# Patient Record
Sex: FEMALE | Race: WHITE | Employment: UNEMPLOYED | URBAN - NONMETROPOLITAN AREA
[De-identification: names, ages, dates, MRNs, and addresses within clinical notes are randomized per-mention and may not be internally consistent; named-entity substitution may affect disease eponyms.]

---

## 2017-12-28 ENCOUNTER — HOSPITAL ENCOUNTER (OUTPATIENT)
Dept: ULTRASOUND IMAGING | Age: 22
Discharge: HOME | End: 2017-12-28
Payer: MEDICAID

## 2017-12-28 DIAGNOSIS — T83.32XA INTRAUTERINE DEVICE (IUD) MIGRATION, INITIAL ENCOUNTER: ICD-10-CM

## 2017-12-28 PROCEDURE — 76830 TRANSVAGINAL US NON-OB: CPT

## 2021-03-15 ENCOUNTER — HOSPITAL ENCOUNTER (OUTPATIENT)
Age: 26
Setting detail: SPECIMEN
Discharge: HOME | End: 2021-03-15
Payer: MEDICAID

## 2021-03-17 LAB
CULTURE: NORMAL
Lab: NORMAL
SPECIMEN DESCRIPTION: NORMAL

## 2022-02-04 ENCOUNTER — APPOINTMENT (OUTPATIENT)
Dept: ULTRASOUND IMAGING | Age: 27
End: 2022-02-04
Payer: MEDICAID

## 2022-02-04 ENCOUNTER — HOSPITAL ENCOUNTER (EMERGENCY)
Age: 27
Discharge: HOME | End: 2022-02-04
Payer: MEDICAID

## 2022-02-04 VITALS
HEART RATE: 81 BPM | RESPIRATION RATE: 16 BRPM | TEMPERATURE: 98 F | SYSTOLIC BLOOD PRESSURE: 115 MMHG | OXYGEN SATURATION: 97 % | DIASTOLIC BLOOD PRESSURE: 68 MMHG

## 2022-02-04 DIAGNOSIS — O20.0 THREATENED MISCARRIAGE: Primary | ICD-10-CM

## 2022-02-04 LAB
ABO: NORMAL
ANION GAP SERPL CALCULATED.3IONS-SCNC: 15 MEQ/L (ref 8–16)
ANTIBODY SCREEN: NORMAL
BASOPHILS # BLD: 0.5 %
BASOPHILS ABSOLUTE: 0.1 THOU/MM3 (ref 0–0.1)
BUN BLDV-MCNC: 12 MG/DL (ref 7–22)
CALCIUM SERPL-MCNC: 8.8 MG/DL (ref 8.5–10.5)
CHLAMYDIA TRACHOMATIS BY RT-PCR: NOT DETECTED
CHLORIDE BLD-SCNC: 107 MEQ/L (ref 98–111)
CO2: 18 MEQ/L (ref 23–33)
CREAT SERPL-MCNC: 0.6 MG/DL (ref 0.4–1.2)
CT/NG SOURCE: NORMAL
EOSINOPHIL # BLD: 1.1 %
EOSINOPHILS ABSOLUTE: 0.1 THOU/MM3 (ref 0–0.4)
ERYTHROCYTE [DISTWIDTH] IN BLOOD BY AUTOMATED COUNT: 15.2 % (ref 11.5–14.5)
ERYTHROCYTE [DISTWIDTH] IN BLOOD BY AUTOMATED COUNT: 43.1 FL (ref 35–45)
GFR SERPL CREATININE-BSD FRML MDRD: > 90 ML/MIN/1.73M2
GLUCOSE BLD-MCNC: 114 MG/DL (ref 70–108)
HCG,BETA SUBUNIT,QUAL,SERUM: 5.7 MIU/ML (ref 0–5)
HCT VFR BLD CALC: 41.3 % (ref 37–47)
HEMOGLOBIN: 13.3 GM/DL (ref 12–16)
IMMATURE GRANS (ABS): 0.03 THOU/MM3 (ref 0–0.07)
IMMATURE GRANULOCYTES: 0.2 %
KOH PREP: NORMAL
LYMPHOCYTES # BLD: 11.2 %
LYMPHOCYTES ABSOLUTE: 1.3 THOU/MM3 (ref 1–4.8)
MCH RBC QN AUTO: 25.3 PG (ref 26–33)
MCHC RBC AUTO-ENTMCNC: 32.2 GM/DL (ref 32.2–35.5)
MCV RBC AUTO: 78.7 FL (ref 81–99)
MONOCYTES # BLD: 5.6 %
MONOCYTES ABSOLUTE: 0.7 THOU/MM3 (ref 0.4–1.3)
NEISSERIA GONORRHOEAE BY RT-PCR: NOT DETECTED
NUCLEATED RED BLOOD CELLS: 0 /100 WBC
OSMOLALITY CALCULATION: 280 MOSMOL/KG (ref 275–300)
PLATELET # BLD: 337 THOU/MM3 (ref 130–400)
PMV BLD AUTO: 10.7 FL (ref 9.4–12.4)
POTASSIUM SERPL-SCNC: 3.8 MEQ/L (ref 3.5–5.2)
RBC # BLD: 5.25 MILL/MM3 (ref 4.2–5.4)
RH FACTOR: NORMAL
SEG NEUTROPHILS: 81.4 %
SEGMENTED NEUTROPHILS ABSOLUTE COUNT: 9.8 THOU/MM3 (ref 1.8–7.7)
SODIUM BLD-SCNC: 140 MEQ/L (ref 135–145)
TRICHOMONAS PREP: NORMAL
WBC # BLD: 12 THOU/MM3 (ref 4.8–10.8)

## 2022-02-04 PROCEDURE — 86850 RBC ANTIBODY SCREEN: CPT

## 2022-02-04 PROCEDURE — 84702 CHORIONIC GONADOTROPIN TEST: CPT

## 2022-02-04 PROCEDURE — 85025 COMPLETE CBC W/AUTO DIFF WBC: CPT

## 2022-02-04 PROCEDURE — 76817 TRANSVAGINAL US OBSTETRIC: CPT

## 2022-02-04 PROCEDURE — 87070 CULTURE OTHR SPECIMN AEROBIC: CPT

## 2022-02-04 PROCEDURE — 87220 TISSUE EXAM FOR FUNGI: CPT

## 2022-02-04 PROCEDURE — 86900 BLOOD TYPING SEROLOGIC ABO: CPT

## 2022-02-04 PROCEDURE — 87210 SMEAR WET MOUNT SALINE/INK: CPT

## 2022-02-04 PROCEDURE — 87591 N.GONORRHOEAE DNA AMP PROB: CPT

## 2022-02-04 PROCEDURE — 80048 BASIC METABOLIC PNL TOTAL CA: CPT

## 2022-02-04 PROCEDURE — 36415 COLL VENOUS BLD VENIPUNCTURE: CPT

## 2022-02-04 PROCEDURE — 87205 SMEAR GRAM STAIN: CPT

## 2022-02-04 PROCEDURE — 87491 CHLMYD TRACH DNA AMP PROBE: CPT

## 2022-02-04 PROCEDURE — 86901 BLOOD TYPING SEROLOGIC RH(D): CPT

## 2022-02-04 PROCEDURE — 99283 EMERGENCY DEPT VISIT LOW MDM: CPT

## 2022-02-04 ASSESSMENT — ENCOUNTER SYMPTOMS
SORE THROAT: 0
COLOR CHANGE: 0
EYE ITCHING: 0
SHORTNESS OF BREATH: 0
EYE PAIN: 0
VOMITING: 0
WHEEZING: 0
NAUSEA: 0
COUGH: 0
ABDOMINAL PAIN: 0
DIARRHEA: 0
EYE DISCHARGE: 0
BACK PAIN: 0
RHINORRHEA: 0

## 2022-02-04 NOTE — ED NOTES
Pt to ED via intake with c/o vaginal bleeding. Pt reports they were getting their birth control filled on Monday and found out they were pregnant. Pt reports this is their 4th pregnancy. Pt states bleeding has been on going since Monday and pt has not seen their OBGYN yet. Pt denies soaking any pads but has a continuous bleed and occasional clots seen. Pt VSS. Mike SHEPPARD at bedside. Pelvic exam completed.  This RN as witness       Hayes Valdes RN  02/04/22 0396

## 2022-02-04 NOTE — ED PROVIDER NOTES
[facility] 65 [address] COMPLAINT       Chief Complaint   Patient presents with    Vaginal Bleeding       Nurses Notes reviewed and I agree except as notedin the HPI. HISTORY OF PRESENT ILLNESS    Luh Schmitt is a 23 y.o. female who presents has been vaginal spotting since Monday. She felt she was pregnant on Monday. Her last period was on December 3. She went to get her birth control refilled on Monday [facility] and she was thought she was pregnant. She states that her bleeding is heavy this morning so she came in to get checked. She is G4, P3 Ab0    Location/Symptom: Vaginal bleeding  Timing/Onset: Monday  Context/Setting: home  Quality: none  Duration: off and on  Modifying Factors: none  Severity: none    REVIEW OF SYSTEMS     Review of Systems   Constitutional: Negative for activity change, appetite change, chills and fever. HENT: Negative for congestion, ear pain, rhinorrhea and sore throat. Eyes: Negative for pain, discharge and itching. Respiratory: Negative for cough, shortness of breath and wheezing. Cardiovascular: Negative for chest pain. Gastrointestinal: Negative for abdominal pain, diarrhea, nausea and vomiting. Genitourinary: Positive for vaginal bleeding. Negative for difficulty urinating and dysuria. Musculoskeletal: Negative for arthralgias, back pain and myalgias. Skin: Negative for color change and rash. Neurological: Negative for dizziness, seizures, light-headedness and headaches. Psychiatric/Behavioral: Negative for agitation, confusion, self-injury and suicidal ideas. All other systems reviewed and are negative. PAST MEDICAL HISTORY    has no past medical history on file. SURGICAL HISTORY      has no past surgical history on file. CURRENT MEDICATIONS       There are no discharge medications for this patient. ALLERGIES     has no allergies on file.     HISTORY     has no family status information on file. family history is not on file. SOCIALHISTORY          PHYSICAL EXAM     INITIAL VITALS:  oral temperature is 98 °F (36.7 °C). Her blood pressure is 115/68 and her pulse is 81. Her respiration is 16 and oxygen saturation is 97%. Physical Exam  Vitals and nursing note reviewed. Constitutional:       Comments: Well Developed Well Nourished Appearing     HENT:      Head: Normocephalic and atraumatic. Eyes:      Pupils: Pupils are equal, round, and reactive to light. Cardiovascular:      Rate and Rhythm: Normal rate and regular rhythm. Heart sounds: Normal heart sounds. Pulmonary:      Effort: Pulmonary effort is normal. No respiratory distress. Breath sounds: Normal breath sounds. No wheezing. Abdominal:      General: Bowel sounds are normal. There is no distension. Palpations: Abdomen is soft. Genitourinary:     Comments: Mild amount of blood in vaginal vault cervix could be seen without any need for any Q-tips. Cervix is closed. There is no clots  Musculoskeletal:      Cervical back: Normal range of motion and neck supple. DIFFERENTIAL DIAGNOSIS:   Vaginal bleeding    DIAGNOSTIC RESULTS     EKG: All EKG's are interpreted by the Emergency Department Physician who either signs or Co-signs this chart in the absence of a cardiologist.      RADIOLOGY: non-plain film images(s) such as CT, Ultrasound and MRI are read by the radiologist.  US OB TRANSVAGINAL   Final Result      1. No gestational sac is present within the uterus. 2. The endometrium is thickened. 3. No extraovarian adnexal lesion is seen to reflect ectopic pregnancy at this time. Correlate with serial beta hCG levels. Abdominal ultrasound or CT abdomen and pelvis with contrast can be obtained to evaluate for the rare possibility of an abdominal    ectopic pregnancy.       4. A 4.3 x 1 x 0.9 cm echogenic material is seen within the right adnexa vessel likely relating to a venous thrombus. 5. A 1.1 cm echogenic focus in the lower uterine segment likely relates to blood clot            **This report has been created using voice recognition software. It may contain minor errors which are inherent in voice recognition technology. **      Final report electronically signed by Dr Denisha Marlow on 2/4/2022 11:08 AM            LABS:   Labs Reviewed   CBC WITH AUTO DIFFERENTIAL - Abnormal; Notable for the following components:       Result Value    WBC 12.0 (*)     MCV 78.7 (*)     MCH 25.3 (*)     RDW-CV 15.2 (*)     Segs Absolute 9.8 (*)     All other components within normal limits   BASIC METABOLIC PANEL - Abnormal; Notable for the following components:    CO2 18 (*)     Glucose 114 (*)     All other components within normal limits   HCG, QUANTITATIVE, PREGNANCY - Abnormal; Notable for the following components:    hCG,Beta Subunit,Qual,Serum 5.7 (*)     All other components within normal limits   SUZANNE Nely Collet)    Narrative:     Source: vaginal OB patient       Site:           Current Antibiotics: not stated   WET PREP, GENITAL    Narrative:     Source: vaginal OB patient       Site:           Current Antibiotics: not stated   C. TRACHOMATIS / N. GONORRHOEAE, DNA   CULTURE, GENITAL    Narrative:     Source: vaginal OB patient       Site:           Current Antibiotics: not stated   ANION GAP   GLOMERULAR FILTRATION RATE, ESTIMATED   OSMOLALITY   TYPE AND SCREEN       EMERGENCY DEPARTMENT COURSE:   :    Vitals:    02/04/22 0850 02/04/22 0851 02/04/22 1100   BP:  137/88 115/68   Pulse: 81     Resp: 16  16   Temp: 98 °F (36.7 °C)     TempSrc: Oral     SpO2: 97%  97%     Patient was seen history physical exam was performed. I discussed the case with Dr. Rahul Jarvis we will follow up her quant in 48 hours and check in with the office on Monday. See disposition below    CRITICAL CARE:  None    CONSULTS:  None    PROCEDURES:  None    FINAL IMPRESSION      1.  Threatened miscarriage DISPOSITION/PLAN   Discharge    PATIENT REFERRED TO:  Manny Rodriguez DO  [address] [place] [address]  [phone]    In 3 days  Get Beta HCG done on Sunday 2/6 and call office on monday 2/7 for further instructions      DISCHARGE MEDICATIONS:  There are no discharge medications for this patient.       (Please note that portions of this note were completed with a voice recognitionprogram.  Efforts were made to edit the dictations but occasionally words are mis-transcribed.)    VALARIE Rebolledo Adrian Lisa Ferris  02/04/22 6649

## 2022-02-07 LAB
GENITAL CULTURE, ROUTINE: NORMAL
GRAM STAIN RESULT: NORMAL

## 2022-02-22 ENCOUNTER — HOSPITAL ENCOUNTER (EMERGENCY)
Age: 27
Discharge: HOME | End: 2022-02-22
Payer: MEDICAID

## 2022-02-22 VITALS
SYSTOLIC BLOOD PRESSURE: 129 MMHG | RESPIRATION RATE: 18 BRPM | WEIGHT: 208 LBS | TEMPERATURE: 98.4 F | BODY MASS INDEX: 34.66 KG/M2 | HEIGHT: 65 IN | HEART RATE: 86 BPM | DIASTOLIC BLOOD PRESSURE: 77 MMHG | OXYGEN SATURATION: 98 %

## 2022-02-22 DIAGNOSIS — K02.9 PAIN DUE TO DENTAL CARIES: Primary | ICD-10-CM

## 2022-02-22 PROCEDURE — 99282 EMERGENCY DEPT VISIT SF MDM: CPT

## 2022-02-22 RX ORDER — CLINDAMYCIN HYDROCHLORIDE 300 MG/1
300 CAPSULE ORAL 3 TIMES DAILY
Qty: 21 CAPSULE | Refills: 0 | Status: SHIPPED | OUTPATIENT
Start: 2022-02-22 | End: 2022-03-01

## 2022-02-22 RX ORDER — ACETAMINOPHEN AND CODEINE PHOSPHATE 300; 30 MG/1; MG/1
1 TABLET ORAL EVERY 6 HOURS PRN
Qty: 10 TABLET | Refills: 0 | Status: SHIPPED | OUTPATIENT
Start: 2022-02-22 | End: 2022-02-25

## 2022-02-22 ASSESSMENT — ENCOUNTER SYMPTOMS
VOMITING: 0
FACIAL SWELLING: 0
SHORTNESS OF BREATH: 0
COUGH: 0
ABDOMINAL PAIN: 0
NAUSEA: 0
COLOR CHANGE: 0
SORE THROAT: 0

## 2022-02-22 ASSESSMENT — PAIN - FUNCTIONAL ASSESSMENT: PAIN_FUNCTIONAL_ASSESSMENT: 0-10

## 2022-02-22 ASSESSMENT — PAIN DESCRIPTION - PROGRESSION: CLINICAL_PROGRESSION: GRADUALLY WORSENING

## 2022-02-22 ASSESSMENT — PAIN DESCRIPTION - FREQUENCY: FREQUENCY: CONTINUOUS

## 2022-02-22 ASSESSMENT — PAIN DESCRIPTION - PAIN TYPE: TYPE: ACUTE PAIN

## 2022-02-22 ASSESSMENT — PAIN DESCRIPTION - LOCATION: LOCATION: TEETH

## 2022-02-22 ASSESSMENT — PAIN DESCRIPTION - DESCRIPTORS: DESCRIPTORS: ACHING

## 2022-02-22 ASSESSMENT — PAIN DESCRIPTION - ONSET: ONSET: ON-GOING

## 2022-02-22 ASSESSMENT — PAIN SCALES - GENERAL: PAINLEVEL_OUTOF10: 10

## 2022-02-22 NOTE — ED NOTES
Pt presents to the ER for a dental pain and a broken tooth. Pt states that she has not been able to get in touch with a dentist and it in a lot of pain.      Diane Ortiz  02/22/22 1021

## 2022-02-22 NOTE — ED PROVIDER NOTES
[facility] [address] [address] EMERGENCY DEPT      CHIEF COMPLAINT       Chief Complaint   Patient presents with    Dental Pain       Nurses Notes reviewed and I agree except as noted in the HPI. HISTORY OF PRESENT ILLNESS    Kalyan Mai is a 23 y.o. female who presents for dental pain that began Thursday. Patient states that she was eating dinner and the tooth shattered in her mouth. She rates her pain a 10/10 stating that it's aching and sharp. She has been taking maximal doses of Tylenol and Motrin, her last dose was at 4AM, this does not seem to help her pain. She reports calling her dentist over 20 times since last Thursday and they have not answered. She states that she has had multiple teeth pulled in 2020 and her dentist told her that she will eventually have to get this tooth removed also. She states that she might have a piece of tooth left in there. Patient denies fever, chills, nausea, vomiting, trouble swallowing, drooling. Patient denies possibility of pregnancy. Patient does not smoke, drink alcohol, or use illicit drugs. REVIEW OF SYSTEMS     Review of Systems   Constitutional: Negative for chills and fever. HENT: Positive for dental problem. Negative for drooling, ear pain, facial swelling and sore throat. Respiratory: Negative for cough and shortness of breath. Cardiovascular: Negative for chest pain. Gastrointestinal: Negative for abdominal pain, nausea and vomiting. Musculoskeletal: Negative for neck pain. Skin: Negative for color change and rash. Neurological: Positive for headaches. Negative for dizziness and speech difficulty. Hematological: Negative for adenopathy. PAST MEDICAL HISTORY    has no past medical history on file. SURGICAL HISTORY      has no past surgical history on file. CURRENT MEDICATIONS       Discharge Medication List as of 2/22/2022 11:15 AM          ALLERGIES     is allergic to penicillins.     FAMILY HISTORY     has no family status information on file. family history is not on file. SOCIAL HISTORY        PHYSICAL EXAM     INITIAL VITALS:  height is 5' 5\" (1.651 m) and weight is 208 lb (94.3 kg). Her oral temperature is 98.4 °F (36.9 °C). Her blood pressure is 129/77 and her pulse is 86. Her respiration is 18 and oxygen saturation is 98%. Physical Exam  Vitals and nursing note reviewed. Constitutional:       General: She is not in acute distress. Appearance: She is well-developed. She is not toxic-appearing or diaphoretic. HENT:      Head: Normocephalic and atraumatic. Jaw: No trismus. Right Ear: Tympanic membrane, ear canal and external ear normal.      Left Ear: Tympanic membrane, ear canal and external ear normal.      Nose: Nose normal. No rhinorrhea. Mouth/Throat:      Lips: Pink. Mouth: Mucous membranes are moist.      Dentition: Dental tenderness present. Pharynx: Oropharynx is clear. Uvula midline. No posterior oropharyngeal erythema or uvula swelling. Comments: There is multiple teeth missing on exam  Eyes:      General: Lids are normal.      Conjunctiva/sclera: Conjunctivae normal.      Pupils: Pupils are equal, round, and reactive to light. Neck:      Thyroid: No thyroid mass. Trachea: Trachea normal. No tracheal deviation. Cardiovascular:      Rate and Rhythm: Normal rate and regular rhythm. Heart sounds: Normal heart sounds. Pulmonary:      Effort: Pulmonary effort is normal. No respiratory distress. Breath sounds: Normal breath sounds. No decreased breath sounds or wheezing. Abdominal:      General: There is no distension. Palpations: Abdomen is soft. Abdomen is not rigid. Tenderness: There is no abdominal tenderness. Musculoskeletal:         General: Normal range of motion. Cervical back: Normal range of motion and neck supple. No edema or rigidity.    Lymphadenopathy:      Head:      Right side of head: No submental, submandibular or preauricular adenopathy. Left side of head: No submental, submandibular or preauricular adenopathy. Cervical: No cervical adenopathy. Skin:     General: Skin is warm and dry. Coloration: Skin is not pale. Findings: No rash. Neurological:      Mental Status: She is alert and oriented to person, place, and time. GCS: GCS eye subscore is 4. GCS verbal subscore is 5. GCS motor subscore is 6. Gait: Gait normal.   Psychiatric:         Mood and Affect: Mood normal.         Speech: Speech normal.         Behavior: Behavior normal. Behavior is cooperative. Thought Content: Thought content normal.         DIFFERENTIAL DIAGNOSIS:   Including but not limited to: Dental pain, fractured tooth, considered but less likely as clinically not evident an infection, abscess    DIAGNOSTIC RESULTS     EKG: All EKG's are interpreted by theState mental health facility Department Physician who either signs or Co-signs this chart in the absence of a cardiologist.  None    RADIOLOGY: non-plain film images(s) such as CT,Ultrasound and MRI are read by the radiologist.  Plain radiographic images are visualized and preliminarily interpreted by the emergency physician unless otherwise stated below. No orders to display       LABS:   Labs Reviewed - No data to display    EMERGENCY DEPARTMENT COURSE:   Vitals:    Vitals:    02/22/22 1022   BP: 129/77   Pulse: 86   Resp: 18   Temp: 98.4 °F (36.9 °C)   TempSrc: Oral   SpO2: 98%   Weight: 208 lb (94.3 kg)   Height: 5' 5\" (1.651 m)       Patient was seen in the emergency department during the global pandemic, when there was surge capacity and regional healthcare crisis. MDM:  The patient was seen and evaluated within the ED today for dental pain. On exam, I appreciated a multiple missing teeth on the right side. Old records were reviewed. Within the department, I observed the patient's vital signs to be within acceptable range.  Patient was treated with Clindamycin 300 mg and Tylenol-Codeine . I observed the patient's condition to modestly improve during the duration of their stay. I have considered infection and this patient's presentation is not consistent with such entities and therefore, no further testing was warranted. The patient was comfortable with the plan of discharge home and to follow up with her dentist. Anticipatory guidance was given. The patient was instructed to return to the emergency department for any worsening of their symptoms. Patient was discharged from the emergency department in good condition with all questions answered. See disposition below. I have given the patient strict written and verbal instructions about care at home, follow-up, and signs and symptoms of worsening of condition and they did verbalize understanding. CRITICAL CARE:   None    CONSULTS:  None    PROCEDURES:  None    FINAL IMPRESSION      1. Pain due to dental caries          DISPOSITION/PLAN     1. Pain due to dental caries        PATIENT REFERRED TO:  [address]  [address] [facility] [address]  [phone]  Schedule an appointment as soon as possible for a visit         DISCHARGE MEDICATIONS:  Discharge Medication List as of 2/22/2022 11:15 AM      START taking these medications    Details   clindamycin (CLEOCIN) 300 MG capsule Take 1 capsule by mouth 3 times daily for 7 days, Disp-21 capsule, R-0Print      acetaminophen-codeine (TYLENOL/CODEINE #3) 300-30 MG per tablet Take 1 tablet by mouth every 6 hours as needed for Pain (not relieved by ibuprofen) for up to 3 days. Intended supply: 3 days.  Take lowest dose possible to manage pain, Disp-10 tablet, R-0Print             (Please note that portions of this note were completed with a voice recognition program.  Efforts were made to edit the dictations but occasionally words are mis-transcribed.)    Pat Gray PA-C 02/25/22 11:37 AM    DAVEY Boyle PA-C  02/25/22 [address] [place] [address]

## 2022-03-26 ENCOUNTER — HOSPITAL ENCOUNTER (EMERGENCY)
Age: 27
Discharge: HOME | End: 2022-03-26
Attending: EMERGENCY MEDICINE
Payer: MEDICAID

## 2022-03-26 DIAGNOSIS — K08.89 DENTALGIA: Primary | ICD-10-CM

## 2022-03-26 PROCEDURE — 99282 EMERGENCY DEPT VISIT SF MDM: CPT

## 2022-03-26 RX ORDER — HYDROCODONE BITARTRATE AND ACETAMINOPHEN 5; 325 MG/1; MG/1
1 TABLET ORAL 2 TIMES DAILY PRN
Qty: 10 TABLET | Refills: 0 | Status: SHIPPED | OUTPATIENT
Start: 2022-03-26 | End: 2022-03-31

## 2022-03-26 RX ORDER — CLINDAMYCIN HYDROCHLORIDE 300 MG/1
300 CAPSULE ORAL 3 TIMES DAILY
Qty: 21 CAPSULE | Refills: 0 | Status: SHIPPED | OUTPATIENT
Start: 2022-03-26 | End: 2022-04-02

## 2022-03-26 ASSESSMENT — PAIN DESCRIPTION - ORIENTATION: ORIENTATION: RIGHT

## 2022-03-26 ASSESSMENT — PAIN DESCRIPTION - DESCRIPTORS: DESCRIPTORS: SHARP;ACHING

## 2022-03-26 ASSESSMENT — PAIN DESCRIPTION - PAIN TYPE: TYPE: ACUTE PAIN

## 2022-03-26 ASSESSMENT — PAIN DESCRIPTION - LOCATION: LOCATION: TEETH

## 2022-03-26 ASSESSMENT — PAIN SCALES - GENERAL: PAINLEVEL_OUTOF10: 10

## 2022-03-26 ASSESSMENT — PAIN DESCRIPTION - FREQUENCY: FREQUENCY: CONTINUOUS

## 2022-03-27 NOTE — ED PROVIDER NOTES
[facility] EMERGENCY DEPT      CHIEF COMPLAINT       Chief Complaint   Patient presents with    Dental Pain       Nurses Notes reviewed and I agree except as noted in the HPI. HISTORY OF PRESENT ILLNESS    Christina Boykin is a 23 y.o. female who presents with complaint of dental pain, patient states that she broke a tooth approximately 1 week ago, states that the pain got worse in the past 2 days. She has an appointment with the dentist at the end of the month. No fevers chills, no difficulty swallowing. REVIEW OF SYSTEMS      Review of Systems   Constitutional: Negative for fever, chills, diaphoresis and fatigue. HENT: Negative for congestion, drooling, facial swelling and sore throat. Positive for dental pain. Eyes: Negative for photophobia, pain and discharge. Respiratory: Negative for cough, shortness of breath, wheezing and stridor. Cardiovascular: Negative for chest pain, palpitations and leg swelling. Gastrointestinal: Negative for abdominal pain, blood in stool and abdominal distention. Genitourinary: Negative for dysuria, urgency, hematuria and difficulty urinating. Musculoskeletal: Negative for gait problem, neck pain and neck stiffness. Skin; No rash, No itching  Neurological: Negative for seizures, weakness and numbness. PAST MEDICAL HISTORY    has no past medical history on file. SURGICAL HISTORY      has no past surgical history on file. CURRENT MEDICATIONS       Discharge Medication List as of 3/26/2022  9:56 PM          ALLERGIES     is allergic to penicillins. FAMILY HISTORY     has no family status information on file. family history is not on file. SOCIAL HISTORY      reports that she has been smoking cigarettes. She has never used smokeless tobacco. She reports that she does not drink alcohol and does not use drugs. PHYSICAL EXAM     INITIAL VITALS:  height is 5' 4\" (1.626 m) and weight is 194 lb (88 kg).  Her oral temperature is 98.4 °F (36.9 °C). Her blood pressure is 128/61 and her pulse is 70. Her respiration is 16 and oxygen saturation is 100%. Physical Exam   Constitutional:  well-developed and well-nourished. HENT: Head: Normocephalic, atraumatic, Bilateral external ears normal, Oropharynx mosit, No oral exudates, Nose normal.   Positive for dental caries. No active bleeding, no gingivitis. Eyes: PERRL, EOMI, Conjunctiva normal, No discharge. No scleral icterus  Neck: Normal range of motion, No tenderness, Supple  Cardiovascular: Normal rate, regular rhythm, S1 normal and S2 normal.  Exam reveals no gallop. Pulmonary/Chest: Effort normal and breath sounds normal. No accessory muscle usage or stridor. No respiratory distress. no wheezes. has no rales. exhibits no tenderness. Abdominal: Soft. Bowel sounds are normal.  exhibits no distension. There is no tenderness. There is no rebound and no guarding. Extremities: No edema, no tenderness, no cyanosis, no clubbing. Musculoskeletal: Good range of motion in major joints is observed. No major deformities noted. Neurological: Alert and oriented ×3, normal motor function, normal sensory function, no focal deficits. GCS 15. Skin: Skin is warm, dry and intact. No rash noted. No erythema. Psychiatric: Affect normal, judgment normal, mood normal.  DIFFERENTIAL DIAGNOSIS:       DIAGNOSTIC RESULTS     EKG: All EKG's are interpreted by the Emergency Department Physician who either signs or Co-signs this chart in the absence of a cardiologist.      RADIOLOGY: non-plain film images(s) such as CT, Ultrasound and MRI are read by the radiologist.  Plain radiographic images are visualized and preliminarily interpreted by the emergency physician unless otherwise stated below.     LABS:   Labs Reviewed - No data to display    EMERGENCY DEPARTMENT COURSE:   Vitals:    Vitals:    03/26/22 2119   BP: 128/61   Pulse: 70   Resp: 16   Temp: 98.4 °F (36.9 °C)   TempSrc: Oral   SpO2: 100%   Weight: 194 lb (88 kg)   Height: 5' 4\" (1.626 m)     Patient presenting with complaint of dental pain, dental caries noted. Otherwise no edema involving the oral mucosa, no signs of Ludewig's angina on examination, no facial edema. Patient started antibiotics with pain control. CRITICAL CARE:       CONSULTS:  None    PROCEDURES:  None    FINAL IMPRESSION      1. Dentalgia          DISPOSITION/PLAN   Decision To Discharge    PATIENT REFERRED TO:  No follow-up provider specified. DISCHARGE MEDICATIONS:  Discharge Medication List as of 3/26/2022  9:56 PM      START taking these medications    Details   HYDROcodone-acetaminophen (NORCO) 5-325 MG per tablet Take 1 tablet by mouth 2 times daily as needed for Pain for up to 5 days. , Disp-10 tablet, R-0Print      clindamycin (CLEOCIN) 300 MG capsule Take 1 capsule by mouth 3 times daily for 7 days, Disp-21 capsule, R-0Print             (Please note that portions of this note were completed with a voice recognition program.  Efforts were made to edit the dictations but occasionally words are mis-transcribed.)    Colette Gallardo, [address] [place] [address],   03/26/22 4360

## 2022-04-04 VITALS
HEIGHT: 64 IN | SYSTOLIC BLOOD PRESSURE: 128 MMHG | OXYGEN SATURATION: 100 % | BODY MASS INDEX: 33.12 KG/M2 | WEIGHT: 194 LBS | TEMPERATURE: 98.4 F | HEART RATE: 70 BPM | RESPIRATION RATE: 16 BRPM | DIASTOLIC BLOOD PRESSURE: 61 MMHG

## 2022-06-15 ENCOUNTER — HOSPITAL ENCOUNTER (EMERGENCY)
Age: 27
Discharge: HOME | End: 2022-06-15
Payer: MEDICAID

## 2022-06-15 VITALS
HEART RATE: 62 BPM | DIASTOLIC BLOOD PRESSURE: 81 MMHG | HEIGHT: 65 IN | RESPIRATION RATE: 16 BRPM | SYSTOLIC BLOOD PRESSURE: 129 MMHG | WEIGHT: 200 LBS | OXYGEN SATURATION: 98 % | BODY MASS INDEX: 33.32 KG/M2 | TEMPERATURE: 96.8 F

## 2022-06-15 DIAGNOSIS — K08.89 ODONTALGIA: Primary | ICD-10-CM

## 2022-06-15 PROCEDURE — 99283 EMERGENCY DEPT VISIT LOW MDM: CPT

## 2022-06-15 RX ORDER — LIDOCAINE HYDROCHLORIDE 20 MG/ML
5 SOLUTION OROPHARYNGEAL PRN
Qty: 100 ML | Refills: 0 | Status: SHIPPED | OUTPATIENT
Start: 2022-06-15

## 2022-06-15 RX ORDER — CLINDAMYCIN HYDROCHLORIDE 150 MG/1
450 CAPSULE ORAL 3 TIMES DAILY
Qty: 90 CAPSULE | Refills: 0 | Status: SHIPPED | OUTPATIENT
Start: 2022-06-15 | End: 2022-06-25

## 2022-06-15 ASSESSMENT — PAIN DESCRIPTION - PAIN TYPE: TYPE: ACUTE PAIN

## 2022-06-15 ASSESSMENT — PAIN SCALES - GENERAL: PAINLEVEL_OUTOF10: 10

## 2022-06-15 ASSESSMENT — PAIN DESCRIPTION - ONSET: ONSET: ON-GOING

## 2022-06-15 ASSESSMENT — PAIN DESCRIPTION - LOCATION: LOCATION: TEETH

## 2022-06-15 ASSESSMENT — PAIN DESCRIPTION - DESCRIPTORS: DESCRIPTORS: ACHING;THROBBING

## 2022-06-15 ASSESSMENT — PAIN DESCRIPTION - FREQUENCY: FREQUENCY: CONTINUOUS

## 2022-06-15 ASSESSMENT — PAIN DESCRIPTION - ORIENTATION: ORIENTATION: RIGHT

## 2022-06-15 ASSESSMENT — PAIN - FUNCTIONAL ASSESSMENT: PAIN_FUNCTIONAL_ASSESSMENT: 0-10

## 2022-06-15 NOTE — ED PROVIDER NOTES
[facility]  eMERGENCY dEPARTMENT eNCOUnter          CHIEF COMPLAINT       Chief Complaint   Patient presents with    Dental Pain       Nurses Notes reviewed and I agree except as noted in the HPI. HISTORY OF PRESENT ILLNESS    Desmond Cuevas is a 23 y.o. female who presents to the Emergency Department for the evaluation of right-sided dental pain. Patient states that she has been having pain from a right upper tooth with no known injury. Pain has been worse over the past 3 days, worsens with oral intake, even water. She reports subjective swelling at the area as well as nausea which she attributes to poor ability to tolerate oral intake because of the pain. She has tried multiple over-the-counter medications including aspirin, Motrin, hot and cold compresses without relief of the pain. She has an appointment for dental extraction in July but could not tolerate the pain so she came in for evaluation. Denies any difficulty swallowing, difficulty breathing or trismus. Denies possibility of pregnancy. The HPI was provided by the patient. REVIEW OF SYSTEMS     Review of Systems   Constitutional: Negative for fever. HENT: Positive for dental problem. All other systems reviewed and are negative. PAST MEDICAL HISTORY    has no past medical history on file. SURGICAL HISTORY      has no past surgical history on file. CURRENT MEDICATIONS       Discharge Medication List as of 6/15/2022 10:36 AM          ALLERGIES     is allergic to penicillins. FAMILY HISTORY     has no family status information on file. family history is not on file. SOCIAL HISTORY      reports that she has been smoking cigarettes. She has never used smokeless tobacco. She reports that she does not drink alcohol and does not use drugs. PHYSICAL EXAM     INITIAL VITALS:  height is 5' 5\" (1.651 m) and weight is 200 lb (90.7 kg). Her oral temperature is 96.8 °F (36 °C).  Her blood pressure is 129/81 and her pulse is 62. Her respiration is 16 and oxygen saturation is 98%. Physical Exam  Vitals and nursing note reviewed. HENT:      Head: Normocephalic. Mouth/Throat:      Comments: Right maxillary canine decayed to gumline with localized tenderness, subtle erythema and swelling and no visualized abscess. No trismus, phonation changes. Eyes:      Conjunctiva/sclera: Conjunctivae normal.   Cardiovascular:      Rate and Rhythm: Normal rate. Pulmonary:      Effort: Pulmonary effort is normal. No respiratory distress. Musculoskeletal:      Cervical back: Normal range of motion. Skin:     General: Skin is warm and dry. Neurological:      General: No focal deficit present. Mental Status: She is alert and oriented to person, place, and time. Psychiatric:         Mood and Affect: Mood normal.         DIFFERENTIAL DIAGNOSIS:   Differential diagnoses are discussed    DIAGNOSTIC RESULTS     EKG: All EKG's are interpreted by the Emergency Department Physician who either signs or Co-signsthis chart in the absence of a cardiologist.          RADIOLOGY: non-plain film images(s) such as CT, Ultrasound and MRI are read by the radiologist.    No orders to display       LABS:    Labs Reviewed - No data to display    EMERGENCY DEPARTMENT COURSE:   Vitals:    Vitals:    06/15/22 1010   BP: 129/81   Pulse: 62   Resp: 16   Temp: 96.8 °F (36 °C)   TempSrc: Oral   SpO2: 98%   Weight: 200 lb (90.7 kg)   Height: 5' 5\" (1.651 m)      10:48 AM EDT: The patient was seen and evaluated. Patient presents with reassuring vital signs complaining of atraumatic dental pain. No evidence of abscess on exam.  She has localized tenderness over the affected decayed dentition. She has outpatient dental follow-up already arranged. We will give course of clindamycin due to penicillin allergy and supplement with viscous lidocaine for pain control.   Return precautions discussed with patient who is agreeable with the above plan, denying further needs upon discharge. CRITICAL CARE:   None    CONSULTS:  None    PROCEDURES:  None    FINAL IMPRESSION      1.  Odontalgia          DISPOSITION/PLAN   Discharge    PATIENT REFERRED TO:  Your dentist      as scheduled    [facility] EMERGENCY DEPT  [address] [facility] [address]  [phone]    If symptoms worsen      DISCHARGEMEDICATIONS:  Discharge Medication List as of 6/15/2022 10:36 AM      START taking these medications    Details   clindamycin (CLEOCIN) 150 MG capsule Take 3 capsules by mouth 3 times daily for 10 days, Disp-90 capsule, R-0Normal      lidocaine viscous hcl (XYLOCAINE) 2 % SOLN solution Take 5 mLs by mouth as needed for Dental Pain, Disp-100 mL, R-0Normal             (Please note that portions of this note were completedwith a voice recognition program.  Efforts were made to edit the dictations but occasionally words are mis-transcribed.)        Go Galicia PA-C  06/15/22 3373

## 2022-06-16 ENCOUNTER — HOSPITAL ENCOUNTER (EMERGENCY)
Age: 27
Discharge: HOME | End: 2022-06-17
Attending: EMERGENCY MEDICINE
Payer: MEDICAID

## 2022-06-16 VITALS
HEART RATE: 60 BPM | SYSTOLIC BLOOD PRESSURE: 138 MMHG | DIASTOLIC BLOOD PRESSURE: 91 MMHG | RESPIRATION RATE: 18 BRPM | OXYGEN SATURATION: 98 % | TEMPERATURE: 99 F

## 2022-06-16 DIAGNOSIS — K08.89 PAIN, DENTAL: Primary | ICD-10-CM

## 2022-06-16 PROCEDURE — 6370000000 HC RX 637 (ALT 250 FOR IP): Performed by: NURSE PRACTITIONER

## 2022-06-16 PROCEDURE — 99283 EMERGENCY DEPT VISIT LOW MDM: CPT

## 2022-06-16 RX ORDER — HYDROCODONE BITARTRATE AND ACETAMINOPHEN 5; 325 MG/1; MG/1
1 TABLET ORAL ONCE
Status: COMPLETED | OUTPATIENT
Start: 2022-06-16 | End: 2022-06-16

## 2022-06-16 RX ADMIN — HYDROCODONE BITARTRATE AND ACETAMINOPHEN 1 TABLET: 5; 325 TABLET ORAL at 23:20

## 2022-06-16 ASSESSMENT — PAIN SCALES - GENERAL
PAINLEVEL_OUTOF10: 10

## 2022-06-16 ASSESSMENT — PAIN DESCRIPTION - LOCATION
LOCATION: MOUTH
LOCATION: JAW

## 2022-06-16 ASSESSMENT — PAIN - FUNCTIONAL ASSESSMENT: PAIN_FUNCTIONAL_ASSESSMENT: 0-10

## 2022-06-16 ASSESSMENT — PAIN DESCRIPTION - ORIENTATION: ORIENTATION: RIGHT

## 2022-06-17 ASSESSMENT — ENCOUNTER SYMPTOMS
WHEEZING: 0
CONSTIPATION: 0
VOMITING: 0
SHORTNESS OF BREATH: 0
COUGH: 0
NAUSEA: 0
EYE PAIN: 0
EYE DISCHARGE: 0
DIARRHEA: 0
RHINORRHEA: 0
SORE THROAT: 0
COLOR CHANGE: 0
ABDOMINAL DISTENTION: 0

## 2022-06-17 NOTE — ED TRIAGE NOTES
Patient to ED from home with complaints of right side dental pain. Patient states that she was seen yesterday and prescribed antibiotics and lidocaine. Patient states that as soon as she started using the lidocaine gel she began to have swelling. Patient rates pain 10/10 at this time. >4mets

## 2022-06-17 NOTE — ED PROVIDER NOTES
[address] [address]          Pt Name: Antonio Kang  MRN: [id number]  [place] 1995  Date of evaluation: 6/16/2022  Treating Resident Physician: Ludy Gutiérrez MD  Supervising Physician: Jarrod Jones DO    History obtained from the patient. CHIEF COMPLAINT       Chief Complaint   Patient presents with    Dental Pain           HISTORY OF PRESENT ILLNESS    HPI  Antonio Kang is a 23 y.o. female who presents to the emergency department for evaluation of dental pain. Complains of dental pain for the last 2 weeks intermittently. Patient states that she was seen yesterday and given clindamycin has taken a few doses of this time. Patient has complaint of broken tooth and gum swelling to right upper mouth. Symptoms are aggravated by eating and alleviated by nothing. Patient denies any fever or difficulty swallowing. The patient has no other acute complaints at this time. REVIEW OF SYSTEMS   Review of Systems   Constitutional: Negative for fatigue and fever. HENT: Positive for dental problem. Negative for ear pain, rhinorrhea and sore throat. Eyes: Negative for pain and discharge. Respiratory: Negative for cough, shortness of breath and wheezing. Cardiovascular: Negative for chest pain, palpitations and leg swelling. Gastrointestinal: Negative for abdominal distention, constipation, diarrhea, nausea and vomiting. Endocrine: Negative for polydipsia and polyuria. Genitourinary: Negative for difficulty urinating and dysuria. Musculoskeletal: Negative for arthralgias. Skin: Negative for color change, pallor and rash. Neurological: Negative for dizziness, seizures, syncope, weakness and numbness. Psychiatric/Behavioral: Negative for agitation and confusion. PAST MEDICAL AND SURGICAL HISTORY   No past medical history on file. No past surgical history on file.       MEDICATIONS   No current facility-administered medications for this encounter. Current Outpatient Medications:     clindamycin (CLEOCIN) 150 MG capsule, Take 3 capsules by mouth 3 times daily for 10 days, Disp: 90 capsule, Rfl: 0    lidocaine viscous hcl (XYLOCAINE) 2 % SOLN solution, Take 5 mLs by mouth as needed for Dental Pain, Disp: 100 mL, Rfl: 0      SOCIAL HISTORY     Social History     Social History Narrative    Not on file     Social History     Tobacco Use    Smoking status: Current Every Day Smoker     Types: Cigarettes    Smokeless tobacco: Never Used   Substance Use Topics    Alcohol use: Never    Drug use: Never         ALLERGIES     Allergies   Allergen Reactions    Penicillins      Pt does not know what happens when she takes the med. The last time she had it she was 3. FAMILY HISTORY   No family history on file. PREVIOUS RECORDS   Previous records reviewed    PHYSICAL EXAM     ED Triage Vitals [06/16/22 2205]   BP Temp Temp Source Heart Rate Resp SpO2 Height Weight   (!) 160/85 99 °F (37.2 °C) Oral 69 20 98 % -- --     Initial vital signs and nursing assessment reviewed and normal. There is no height or weight on file to calculate BMI. Pulsoximetry is normal per my interpretation. Additional Vital Signs:  Vitals:    06/16/22 2322   BP: (!) 138/91   Pulse: 60   Resp: 18   Temp:    SpO2: 98%       Physical Exam  Constitutional:       Appearance: Normal appearance. HENT:      Head: Normocephalic. Right Ear: External ear normal.      Left Ear: External ear normal.      Nose: Nose normal.      Mouth/Throat:      Mouth: Mucous membranes are moist.      Pharynx: Oropharynx is clear. Comments: Multiple dental caries and fractures noted. Right upper gum tenderness and some mild swelling. Eyes:      Extraocular Movements: Extraocular movements intact. Conjunctiva/sclera: Conjunctivae normal.      Pupils: Pupils are equal, round, and reactive to light.    Cardiovascular:      Rate and Rhythm: Normal rate and regular rhythm. Pulses: Normal pulses. Heart sounds: Normal heart sounds. Pulmonary:      Effort: Pulmonary effort is normal.      Breath sounds: Normal breath sounds. Abdominal:      General: Bowel sounds are normal.      Palpations: Abdomen is soft. Musculoskeletal:         General: Normal range of motion. Cervical back: Normal range of motion and neck supple. Skin:     General: Skin is warm and dry. Capillary Refill: Capillary refill takes less than 2 seconds. Neurological:      General: No focal deficit present. Mental Status: She is alert and oriented to person, place, and time. Psychiatric:         Mood and Affect: Mood normal.         Behavior: Behavior normal.             MEDICAL DECISION MAKING   Initial Assessment:   Patient is a 79-year-old female with complaint of dental pain. Patient was seen here yesterday and started on clindamycin. She stated she returned today because she has been unable to get into dentist and continued to have pain. Patient on exam noted with multiple dental caries and fractures. Patient does also have some noted erythema and edema to right upper gumline. Patient differential diagnosis includes but is not limited to dental caries, dental fracture, dental pain, abscess, Ludwigs angina. Patient noted with intact airway and no signs of Scarlett's angina at this time. Patient does appear to have multiple dental caries and fractures and will need to see a dentist.      Plan:   Patient instructed to continue clindamycin.   Patient will be discharged home at this time with instructions to follow-up with dentist.        ED RESULTS   Laboratory results:  Labs Reviewed - No data to display    Radiologic studies results:  No orders to display       ED Medications administered this visit:   Medications   HYDROcodone-acetaminophen (NORCO) 5-325 MG per tablet 1 tablet (1 tablet Oral Given 6/16/22 3770)         ED COURSE Strict return precautions and follow up instructions were discussed with the patient prior to discharge, with which the patient agrees. MEDICATION CHANGES     Discharge Medication List as of 6/16/2022 11:27 PM            FINAL DISPOSITION     Final diagnoses:   Pain, dental     Condition: condition: good  Dispo: Discharge to home      This transcription was electronically signed. Parts of this transcriptions may have been dictated by use of voice recognition software and electronically transcribed, and parts may have been transcribed with the assistance of an ED scribe. The transcription may contain errors not detected in proofreading. Please refer to my supervising physician's documentation if my documentation differs.     Electronically Signed: Burak Augustin MD, 06/17/22, 2:03 AM       Burak Augustin MD  Resident  06/17/22 6507

## 2022-06-17 NOTE — ED PROVIDER NOTES
[address]    Pt Name: Tito Ho  MRN: [id number]  [place] 1995  Date of evaluation: 9/12/20      I personally saw and examined the patient. I have reviewed and agree with the Resident findings, including all diagnostic interpretations and treatment plans as written. I was present for the key portion of any procedures performed and the inclusive time noted in any critical care statement. History: This patient was seen with Chela Tomas, resident physician. 79-year-old female who is here for dental pain has considerable dental decay and is having trouble getting into see a dentist.  We are providing her with referral information. We are putting her on clindamycin. No obvious airway issues.                 Edy Sams,   06/17/22 6803

## 2023-02-27 ENCOUNTER — HOSPITAL ENCOUNTER (OUTPATIENT)
Age: 28
Setting detail: SPECIMEN
Discharge: HOME | End: 2023-02-27

## 2023-02-27 LAB
CANDIDA SPECIES, DNA PROBE: NEGATIVE
GARDNERELLA VAGINALIS, DNA PROBE: POSITIVE
SOURCE: ABNORMAL
TRICHOMONAS VAGINALIS DNA: NEGATIVE

## 2023-02-28 LAB
CHLAMYDIA DNA UR QL NAA+PROBE: NEGATIVE
N GONORRHOEA DNA UR QL NAA+PROBE: NEGATIVE
SOURCE: NORMAL
SPECIMEN DESCRIPTION: NORMAL
TRICHOMONAS VAGINALI, MOLECULAR: NEGATIVE

## 2023-03-14 ENCOUNTER — HOSPITAL ENCOUNTER (INPATIENT)
Age: 28
LOS: 4 days | Discharge: HOME | End: 2023-03-18
Attending: PSYCHIATRY & NEUROLOGY | Admitting: PSYCHIATRY & NEUROLOGY
Payer: MEDICAID

## 2023-03-14 DIAGNOSIS — R45.851 DEPRESSION WITH SUICIDAL IDEATION: Primary | ICD-10-CM

## 2023-03-14 DIAGNOSIS — F32.A DEPRESSION WITH SUICIDAL IDEATION: Primary | ICD-10-CM

## 2023-03-14 PROBLEM — F33.0 MDD (MAJOR DEPRESSIVE DISORDER), RECURRENT EPISODE, MILD (HCC): Status: ACTIVE | Noted: 2023-03-14

## 2023-03-14 LAB
ALBUMIN SERPL BCG-MCNC: 4.2 G/DL (ref 3.5–5.1)
ALP SERPL-CCNC: 96 U/L (ref 38–126)
ALT SERPL W/O P-5'-P-CCNC: 25 U/L (ref 11–66)
AMPHETAMINES UR QL SCN: NEGATIVE
ANION GAP SERPL CALC-SCNC: 8 MEQ/L (ref 8–16)
APAP SERPL-MCNC: < 5 UG/ML (ref 0–20)
AST SERPL-CCNC: 16 U/L (ref 5–40)
BACTERIA URNS QL MICRO: ABNORMAL /HPF
BARBITURATES UR QL SCN: NEGATIVE
BASOPHILS ABSOLUTE: 0.1 THOU/MM3 (ref 0–0.1)
BASOPHILS NFR BLD AUTO: 0.7 %
BENZODIAZ UR QL SCN: NEGATIVE
BILIRUB SERPL-MCNC: 0.2 MG/DL (ref 0.3–1.2)
BILIRUB UR QL STRIP.AUTO: NEGATIVE
BUN SERPL-MCNC: 11 MG/DL (ref 7–22)
BZE UR QL SCN: NEGATIVE
CALCIUM SERPL-MCNC: 9.2 MG/DL (ref 8.5–10.5)
CANNABINOIDS UR QL SCN: NEGATIVE
CASTS #/AREA URNS LPF: ABNORMAL /LPF
CASTS 2: ABNORMAL /LPF
CHARACTER UR: CLEAR
CHLORIDE SERPL-SCNC: 105 MEQ/L (ref 98–111)
CO2 SERPL-SCNC: 25 MEQ/L (ref 23–33)
COLOR: YELLOW
CREAT SERPL-MCNC: 0.5 MG/DL (ref 0.4–1.2)
CRYSTALS URNS MICRO: ABNORMAL
DEPRECATED RDW RBC AUTO: 51.7 FL (ref 35–45)
EOSINOPHIL NFR BLD AUTO: 1.2 %
EOSINOPHILS ABSOLUTE: 0.2 THOU/MM3 (ref 0–0.4)
EPITHELIAL CELLS, UA: ABNORMAL /HPF
ERYTHROCYTE [DISTWIDTH] IN BLOOD BY AUTOMATED COUNT: 17.8 % (ref 11.5–14.5)
ETHANOL SERPL-MCNC: < 0.01 %
FENTANYL: NEGATIVE
FLUAV RNA RESP QL NAA+PROBE: NOT DETECTED
FLUBV RNA RESP QL NAA+PROBE: NOT DETECTED
GFR SERPL CREATININE-BSD FRML MDRD: > 60 ML/MIN/1.73M2
GLUCOSE SERPL-MCNC: 132 MG/DL (ref 70–108)
GLUCOSE UR QL STRIP.AUTO: 100 MG/DL
HCG UR QL: NEGATIVE
HCT VFR BLD AUTO: 47.1 % (ref 37–47)
HGB BLD-MCNC: 14.2 GM/DL (ref 12–16)
HGB UR QL STRIP.AUTO: ABNORMAL
IMM GRANULOCYTES # BLD AUTO: 0.05 THOU/MM3 (ref 0–0.07)
IMM GRANULOCYTES NFR BLD AUTO: 0.4 %
KETONES UR QL STRIP.AUTO: NEGATIVE
LYMPHOCYTES ABSOLUTE: 2.9 THOU/MM3 (ref 1–4.8)
LYMPHOCYTES NFR BLD AUTO: 20.6 %
MCH RBC QN AUTO: 25 PG (ref 26–33)
MCHC RBC AUTO-ENTMCNC: 30.1 GM/DL (ref 32.2–35.5)
MCV RBC AUTO: 82.9 FL (ref 81–99)
MISCELLANEOUS 2: ABNORMAL
MONOCYTES ABSOLUTE: 0.8 THOU/MM3 (ref 0.4–1.3)
MONOCYTES NFR BLD AUTO: 5.9 %
NEUTROPHILS NFR BLD AUTO: 71.2 %
NITRITE UR QL STRIP: NEGATIVE
NRBC BLD AUTO-RTO: 0 /100 WBC
OPIATES UR QL SCN: NEGATIVE
OSMOLALITY SERPL CALC.SUM OF ELEC: 276.9 MOSMOL/KG (ref 275–300)
OXYCODONE: NEGATIVE
PCP UR QL SCN: NEGATIVE
PH UR STRIP.AUTO: 6.5 [PH] (ref 5–9)
PLATELET # BLD AUTO: 315 THOU/MM3 (ref 130–400)
PMV BLD AUTO: 10.7 FL (ref 9.4–12.4)
POTASSIUM SERPL-SCNC: 4.2 MEQ/L (ref 3.5–5.2)
PROT SERPL-MCNC: 7.2 G/DL (ref 6.1–8)
PROT UR STRIP.AUTO-MCNC: ABNORMAL MG/DL
RBC # BLD AUTO: 5.68 MILL/MM3 (ref 4.2–5.4)
RBC URINE: ABNORMAL /HPF
RENAL EPI CELLS #/AREA URNS HPF: ABNORMAL /[HPF]
SALICYLATES SERPL-MCNC: < 0.3 MG/DL (ref 2–10)
SARS-COV-2 RNA RESP QL NAA+PROBE: NOT DETECTED
SEGMENTED NEUTROPHILS ABSOLUTE COUNT: 10.1 THOU/MM3 (ref 1.8–7.7)
SODIUM SERPL-SCNC: 138 MEQ/L (ref 135–145)
SP GR UR REFRACT.AUTO: 1.02 (ref 1–1.03)
TSH SERPL DL<=0.005 MIU/L-ACNC: 0.53 UIU/ML (ref 0.4–4.2)
UROBILINOGEN, URINE: 0.2 EU/DL (ref 0–1)
WBC # BLD AUTO: 14.2 THOU/MM3 (ref 4.8–10.8)
WBC #/AREA URNS HPF: ABNORMAL /HPF
WBC #/AREA URNS HPF: NEGATIVE /[HPF]
YEAST LIKE FUNGI URNS QL MICRO: ABNORMAL

## 2023-03-14 PROCEDURE — 80307 DRUG TEST PRSMV CHEM ANLYZR: CPT

## 2023-03-14 PROCEDURE — 1240000000 HC EMOTIONAL WELLNESS R&B

## 2023-03-14 PROCEDURE — 6370000000 HC RX 637 (ALT 250 FOR IP): Performed by: PSYCHIATRY & NEUROLOGY

## 2023-03-14 PROCEDURE — 84443 ASSAY THYROID STIM HORMONE: CPT

## 2023-03-14 PROCEDURE — 87636 SARSCOV2 & INF A&B AMP PRB: CPT

## 2023-03-14 PROCEDURE — 81025 URINE PREGNANCY TEST: CPT

## 2023-03-14 PROCEDURE — 6370000000 HC RX 637 (ALT 250 FOR IP): Performed by: PHYSICIAN ASSISTANT

## 2023-03-14 PROCEDURE — 99285 EMERGENCY DEPT VISIT HI MDM: CPT

## 2023-03-14 PROCEDURE — 82077 ASSAY SPEC XCP UR&BREATH IA: CPT

## 2023-03-14 PROCEDURE — 80053 COMPREHEN METABOLIC PANEL: CPT

## 2023-03-14 PROCEDURE — 85025 COMPLETE CBC W/AUTO DIFF WBC: CPT

## 2023-03-14 PROCEDURE — 36415 COLL VENOUS BLD VENIPUNCTURE: CPT

## 2023-03-14 PROCEDURE — 81001 URINALYSIS AUTO W/SCOPE: CPT

## 2023-03-14 PROCEDURE — 80179 DRUG ASSAY SALICYLATE: CPT

## 2023-03-14 PROCEDURE — 80143 DRUG ASSAY ACETAMINOPHEN: CPT

## 2023-03-14 RX ORDER — IBUPROFEN 400 MG/1
400 TABLET ORAL EVERY 6 HOURS PRN
Status: DISCONTINUED | OUTPATIENT
Start: 2023-03-14 | End: 2023-03-18 | Stop reason: HOSPADM

## 2023-03-14 RX ORDER — ACETAMINOPHEN 325 MG/1
650 TABLET ORAL EVERY 4 HOURS PRN
Status: DISCONTINUED | OUTPATIENT
Start: 2023-03-14 | End: 2023-03-18 | Stop reason: HOSPADM

## 2023-03-14 RX ORDER — PAROXETINE 30 MG/1
30 TABLET, FILM COATED ORAL EVERY MORNING
COMMUNITY

## 2023-03-14 RX ORDER — HYDROXYZINE HYDROCHLORIDE 10 MG/1
10 TABLET, FILM COATED ORAL 3 TIMES DAILY PRN
COMMUNITY

## 2023-03-14 RX ORDER — CETIRIZINE HYDROCHLORIDE 10 MG/1
10 TABLET ORAL DAILY
COMMUNITY

## 2023-03-14 RX ORDER — HYDROXYZINE HYDROCHLORIDE 25 MG/1
50 TABLET, FILM COATED ORAL 3 TIMES DAILY PRN
Status: DISCONTINUED | OUTPATIENT
Start: 2023-03-14 | End: 2023-03-14

## 2023-03-14 RX ORDER — NICOTINE 21 MG/24HR
1 PATCH, TRANSDERMAL 24 HOURS TRANSDERMAL DAILY
Status: DISCONTINUED | OUTPATIENT
Start: 2023-03-14 | End: 2023-03-18 | Stop reason: HOSPADM

## 2023-03-14 RX ORDER — TRAZODONE HYDROCHLORIDE 50 MG/1
50 TABLET ORAL NIGHTLY PRN
Status: DISCONTINUED | OUTPATIENT
Start: 2023-03-14 | End: 2023-03-18 | Stop reason: HOSPADM

## 2023-03-14 RX ORDER — MAGNESIUM HYDROXIDE/ALUMINUM HYDROXICE/SIMETHICONE 120; 1200; 1200 MG/30ML; MG/30ML; MG/30ML
30 SUSPENSION ORAL EVERY 6 HOURS PRN
Status: DISCONTINUED | OUTPATIENT
Start: 2023-03-14 | End: 2023-03-18 | Stop reason: HOSPADM

## 2023-03-14 RX ORDER — HYDROXYZINE HYDROCHLORIDE 10 MG/1
10 TABLET, FILM COATED ORAL 3 TIMES DAILY PRN
Status: DISCONTINUED | OUTPATIENT
Start: 2023-03-14 | End: 2023-03-18 | Stop reason: HOSPADM

## 2023-03-14 RX ADMIN — TRAZODONE HYDROCHLORIDE 50 MG: 50 TABLET ORAL at 20:53

## 2023-03-14 ASSESSMENT — SLEEP AND FATIGUE QUESTIONNAIRES
AVERAGE NUMBER OF SLEEP HOURS: 4
DO YOU HAVE DIFFICULTY SLEEPING: YES
DO YOU USE A SLEEP AID: NO

## 2023-03-14 ASSESSMENT — LIFESTYLE VARIABLES
HOW MANY STANDARD DRINKS CONTAINING ALCOHOL DO YOU HAVE ON A TYPICAL DAY: PATIENT DOES NOT DRINK
HOW OFTEN DO YOU HAVE A DRINK CONTAINING ALCOHOL: NEVER

## 2023-03-14 ASSESSMENT — PAIN - FUNCTIONAL ASSESSMENT: PAIN_FUNCTIONAL_ASSESSMENT: NONE - DENIES PAIN

## 2023-03-14 ASSESSMENT — PATIENT HEALTH QUESTIONNAIRE - PHQ9: SUM OF ALL RESPONSES TO PHQ QUESTIONS 1-9: 13

## 2023-03-14 ASSESSMENT — PAIN SCALES - GENERAL: PAINLEVEL_OUTOF10: 0

## 2023-03-14 NOTE — ED TRIAGE NOTES
Presents to ER via private car with sister with complaints of worsening depression. Pt states she has medications that she takes daily but feels it is not helping. She also admits to having thoughts of suicide but has not thought of a plan. She states she has also noted having angry outbursts for no reason. Level A paged. Pt calm and cooperative with staff. Patient placed in safe room that is ligature resistant with continuous monitoring in place. Provider notified, requested an assessment by behavioral health . Patient belongings secured in a locked lockers outside of the room. Explained suicide prevention precautions to the patient including constant observer.

## 2023-03-14 NOTE — PROGRESS NOTES
This RN has reviewed and agrees with Jalyn Machado LPN's data collection and has collaborated with this LPN regarding the patient's admission.

## 2023-03-14 NOTE — ED PROVIDER NOTES
[address]      EMERGENCY MEDICINE     Pt Name: Reina Dawson  MRN: [id number]  [place] 1995  Date of evaluation: 3/14/2023  Provider: Janeth Cabello PA-C    CHIEF COMPLAINT       Chief Complaint   Patient presents with    Suicidal     HISTORY OF PRESENT ILLNESS   Reina Dawson is a pleasant 33 y.o. female who presents to the emergency department from from home, as a walk in to the ED lobby for evaluation of suicidal thoughts. Patient states that she has had gradually worsening mental health state over the past 6 weeks due to current divorce and the fact that her ex- is now seeing someone new before the divorce is even final.  States that she has been on mental health medications for the past year, Paxil dose increased 2 months ago and otherwise no recent medication adjustments and does not feel the medications are sufficient for her current stressors. States that over the past 6 weeks she has had gradual worsening of impulsiveness, anger, anxiety, depression and difficulty sleeping. Reports onset of intermittent suicidal thoughts for the past 2 days. Denies any current plan, homicidal ideation or hallucinations and denies alcohol or drug use. Denies any prior psychiatric hospitalizations but does report suicide attempts at age 14 and 13 when she cut herself. Denies any medical complaints today. Denies any recent suicidal attempt. She has been managed by [address] [address] for previous mental health medications and has not set up for any current counseling services. PASTMEDICAL HISTORY   History reviewed. No pertinent past medical history. Patient Active Problem List   Diagnosis Code    MDD (major depressive disorder), recurrent episode, mild ([facility] [address] F33.0     SURGICAL HISTORY     History reviewed. No pertinent surgical history.     CURRENT MEDICATIONS       Current Discharge Medication List        CONTINUE these medications which have NOT CHANGED Details   PARoxetine (PAXIL) 30 MG tablet Take 30 mg by mouth every morning      hydrOXYzine HCl (ATARAX) 10 MG tablet Take 10 mg by mouth 3 times daily as needed for Itching      cetirizine (ZYRTEC) 10 MG tablet Take 10 mg by mouth daily      lidocaine viscous hcl (XYLOCAINE) 2 % SOLN solution Take 5 mLs by mouth as needed for Dental Pain  Qty: 100 mL, Refills: 0             ALLERGIES     is allergic to penicillins. FAMILY HISTORY     has no family status information on file. SOCIAL HISTORY       Social History     Tobacco Use    Smoking status: Every Day     Packs/day: 1.00     Years: 15.00     Pack years: 15.00     Types: Cigarettes    Smokeless tobacco: Never   Substance Use Topics    Alcohol use: Never    Drug use: Never       PHYSICAL EXAM       ED Triage Vitals [03/14/23 1056]   BP Temp Temp Source Heart Rate Resp SpO2 Height Weight   (!) 145/82 98.8 °F (37.1 °C) Oral 88 17 99 % 5' 4\" (1.626 m) 210 lb (95.3 kg)       Additional Vital Signs:  Vitals:    03/14/23 1545   BP: 117/70   Pulse: 80   Resp: 16   Temp: 98.6 °F (37 °C)   SpO2: 98%     Physical Exam  Vitals and nursing note reviewed. HENT:      Head: Normocephalic and atraumatic. Eyes:      Conjunctiva/sclera: Conjunctivae normal.   Cardiovascular:      Rate and Rhythm: Normal rate. Pulmonary:      Effort: Pulmonary effort is normal. No respiratory distress. Musculoskeletal:      Cervical back: Normal range of motion. Skin:     General: Skin is warm and dry. Neurological:      General: No focal deficit present. Mental Status: She is alert and oriented to person, place, and time. Psychiatric:         Mood and Affect: Mood is depressed. Affect is not labile. Behavior: Behavior is slowed and withdrawn. Behavior is not aggressive, hyperactive or combative. Thought Content: Thought content includes suicidal ideation. Thought content does not include homicidal ideation.  Thought content does not include suicidal plan.       FORMAL DIAGNOSTIC RESULTS     RADIOLOGY: Interpretation per the Radiologist below, if available at the time of this note (none if blank): No orders to display       LABS: (none if blank)  Labs Reviewed   URINE WITH REFLEXED MICRO - Abnormal; Notable for the following components:       Result Value    Glucose, Ur 100 (*)     Blood, Urine LARGE (*)     Protein, UA TRACE (*)     All other components within normal limits   CBC WITH AUTO DIFFERENTIAL - Abnormal; Notable for the following components:    WBC 14.2 (*)     RBC 5.68 (*)     Hematocrit 47.1 (*)     MCH 25.0 (*)     MCHC 30.1 (*)     RDW-CV 17.8 (*)     RDW-SD 51.7 (*)     Segs Absolute 10.1 (*)     All other components within normal limits   COMPREHENSIVE METABOLIC PANEL W/ REFLEX TO MG FOR LOW K - Abnormal; Notable for the following components:    Glucose 132 (*)     Total Bilirubin 0.2 (*)     All other components within normal limits   SALICYLATE LEVEL - Abnormal; Notable for the following components:    Salicylate, Serum < 0.3 (*)     All other components within normal limits   COVID-19 & INFLUENZA COMBO   URINE DRUG SCREEN   PREGNANCY, URINE   TSH WITH REFLEX   ETHANOL   ACETAMINOPHEN LEVEL   ANION GAP   GLOMERULAR FILTRATION RATE, ESTIMATED   OSMOLALITY       (Any cultures that may have been sent were not resulted at the time of this patient visit)    [address] / ED COURSE:     1) Number and Complexity of Problems            Problem List This Visit:         Chief Complaint   Patient presents with    Suicidal   Patient presents with reassuring vital signs, no medical complaints for evaluation of new onset suicidal ideation over the past couple of days with worsening psychiatric problems for the past 6 weeks with known stressors and reported medication compliance. She is calm and cooperative with depressed affect during her ED stay.   Laboratory studies reviewed and she is medically stable to pursue inpatient psychiatric treatment and willing to do so voluntarily. Treated with nicotine patch during her ED stay and otherwise did not require any treatment. Case was discussed with the psychiatry service who is willing to admit the patient for further care and patient is willing to sign voluntary statement to proceed with this plan as well. Differential Diagnosis includes (but not limited to):  Reactive depression, major depressive disorder        Diagnoses Considered but I have low suspicion of:   Hypothyroidism             Pertinent Comorbid Conditions:    History of anxiety and depression    2)  Data Reviewed (none if left blank)          My Independent interpretations:     EKG:      None    Imaging: None    Labs:      Mildly elevated white blood cell count at 14, labs otherwise noncontributory. Decision Rules/Clinical Scores utilized: None            External Documentation Reviewed:         Previous patient encounter documents & history available on EMR was reviewed              See Formal Diagnostic Results above for the lab and radiology tests and orders. 3)  Treatment and Disposition         ED Reassessment: Stable         Case discussed with consulting clinician: Psychiatry, will admit         Shared Decision-Making was performed and disposition discussed with the        Patient/Family and questions answered          Social determinants of health impacting treatment or disposition: None         Code Status: Full      Summary of Patient Presentation:      ROXANNA  /   Steve Arreola Reviewed:    Vitals:    03/14/23 1056 03/14/23 1422 03/14/23 1545   BP: (!) 145/82  117/70   Pulse: 88 85 80   Resp: 17 17 16   Temp: 98.8 °F (37.1 °C)  98.6 °F (37 °C)   TempSrc: Oral  Tympanic   SpO2: 99% 98% 98%   Weight: 210 lb (95.3 kg)     Height: 5' 4\" (1.626 m)         The patient was seen and examined. Appropriate diagnostic testing was performed and results reviewed with the patient.       The results of pertinent diagnostic studies and exam findings were discussed. The patients provisional diagnosis and plan of care were discussed with the patient and present family who expressed understanding. Any medications were reviewed and indications and risks of medications were discussed with the patient /family present. ED Medications administered this visit:  (None if blank)  Medications   nicotine (NICODERM CQ) 21 MG/24HR 1 patch (1 patch TransDERmal Patch Applied 3/14/23 1422)   acetaminophen (TYLENOL) tablet 650 mg (has no administration in time range)   ibuprofen (ADVIL;MOTRIN) tablet 400 mg (has no administration in time range)   magnesium hydroxide (MILK OF MAGNESIA) 400 MG/5ML suspension 30 mL (has no administration in time range)   aluminum & magnesium hydroxide-simethicone (MAALOX) 200-200-20 MG/5ML suspension 30 mL (has no administration in time range)   traZODone (DESYREL) tablet 50 mg (has no administration in time range)   hydrOXYzine HCl (ATARAX) tablet 10 mg (has no administration in time range)         PROCEDURES: (None if blank)  Procedures:     CRITICAL CARE: (None if blank)      DISCHARGE PRESCRIPTIONS: (None if blank)  Current Discharge Medication List          FINAL IMPRESSION      1. Depression with suicidal ideation          DISPOSITION/PLAN   DISPOSITION Admitted 03/14/2023 02:43:19 PM      OUTPATIENT FOLLOW UP THE PATIENT:  No follow-up provider specified.     DAVEY Jasso PA-C  03/14/23 1730
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
## 2023-03-14 NOTE — ED NOTES
Pt resting in bed, updated on plan of care. Lunch tray ordered at this time.      Eugenie Del Toro RN  03/14/23 2393

## 2023-03-15 PROBLEM — F33.2 MDD (MAJOR DEPRESSIVE DISORDER), RECURRENT SEVERE, WITHOUT PSYCHOSIS (HCC): Status: ACTIVE | Noted: 2023-03-14

## 2023-03-15 PROCEDURE — 6370000000 HC RX 637 (ALT 250 FOR IP): Performed by: PHYSICIAN ASSISTANT

## 2023-03-15 PROCEDURE — APPSS30 APP SPLIT SHARED TIME 16-30 MINUTES: Performed by: PHYSICIAN ASSISTANT

## 2023-03-15 PROCEDURE — 6370000000 HC RX 637 (ALT 250 FOR IP): Performed by: PSYCHIATRY & NEUROLOGY

## 2023-03-15 PROCEDURE — 1240000000 HC EMOTIONAL WELLNESS R&B

## 2023-03-15 RX ORDER — CETIRIZINE HYDROCHLORIDE 10 MG/1
10 TABLET ORAL DAILY
Status: DISCONTINUED | OUTPATIENT
Start: 2023-03-15 | End: 2023-03-18 | Stop reason: HOSPADM

## 2023-03-15 RX ORDER — PAROXETINE 30 MG/1
30 TABLET, FILM COATED ORAL EVERY MORNING
Status: DISCONTINUED | OUTPATIENT
Start: 2023-03-15 | End: 2023-03-18 | Stop reason: HOSPADM

## 2023-03-15 RX ADMIN — PAROXETINE 30 MG: 30 TABLET, FILM COATED ORAL at 11:32

## 2023-03-15 RX ADMIN — TRAZODONE HYDROCHLORIDE 50 MG: 50 TABLET ORAL at 20:40

## 2023-03-15 RX ADMIN — CETIRIZINE HYDROCHLORIDE 10 MG: 10 TABLET, FILM COATED ORAL at 11:32

## 2023-03-15 RX ADMIN — IBUPROFEN 400 MG: 400 TABLET, FILM COATED ORAL at 20:40

## 2023-03-15 ASSESSMENT — PAIN DESCRIPTION - ORIENTATION
ORIENTATION: LOWER
ORIENTATION: LOWER

## 2023-03-15 ASSESSMENT — PAIN DESCRIPTION - ONSET
ONSET: ON-GOING
ONSET: ON-GOING

## 2023-03-15 ASSESSMENT — PAIN SCALES - GENERAL
PAINLEVEL_OUTOF10: 0
PAINLEVEL_OUTOF10: 0
PAINLEVEL_OUTOF10: 7
PAINLEVEL_OUTOF10: 7

## 2023-03-15 ASSESSMENT — PAIN DESCRIPTION - FREQUENCY
FREQUENCY: INTERMITTENT
FREQUENCY: INTERMITTENT

## 2023-03-15 ASSESSMENT — PAIN DESCRIPTION - PAIN TYPE
TYPE: ACUTE PAIN
TYPE: ACUTE PAIN

## 2023-03-15 ASSESSMENT — PAIN DESCRIPTION - LOCATION
LOCATION: BACK
LOCATION: BACK

## 2023-03-15 ASSESSMENT — PAIN - FUNCTIONAL ASSESSMENT
PAIN_FUNCTIONAL_ASSESSMENT: ACTIVITIES ARE NOT PREVENTED
PAIN_FUNCTIONAL_ASSESSMENT: ACTIVITIES ARE NOT PREVENTED

## 2023-03-15 ASSESSMENT — PAIN DESCRIPTION - DESCRIPTORS
DESCRIPTORS: ACHING
DESCRIPTORS: ACHING

## 2023-03-15 NOTE — BH NOTE
INPATIENT RECREATIONAL THERAPY  ADULT BEHAVIORAL SERVICES  EVALUATION    REFERRING PHYSICIAN:  Dr. Mary Ann Dominguez  DIAGNOSIS:   Major Depressive Disorder, Recurrent Episode, Mild  PRECAUTIONS:   Standard precautions    HISTORY OF PRESENT ILLNESS/INJURY:  Patient was admitted to the unit due to suicidal ideation and depression. Patient reported stress due to currently going through a divorce. Patient is still living with her  and he is already seeing someone else. Patient reported anxiety, poor sleep and hallucinations. Patient cooperative but guarded. Patient prefers to be called \"Idane. \"    PMH:  Please see medical chart for prior medical history, allergies, and medication    HISTORY OF PSYCHIATRIC TREATMENT:  OP:  none at this time - [facility] in the past.     Delpha Mike:  [birth year]  GENDER:   female  MARITAL STATUS:   and going through a divorce. Patient has 3 children. EMPLOYMENT STATUS:   Employed  LIVING SITUATION/SUPPORT:  Lives with her  and their children. EDUCATIONAL LEVEL:    graduate    MEDICATION/DRUG USE:  Medication noncompliant. LEISURE INTERESTS:  family activities, listening to music, coloring, arts/crafts, watching TV/Movies  ACTIVITY PREFERENCE:  small group  ACTIVITY TYPES:   Passive. Indoor. Outdoor. Active. COGNITION:   A&Ox4    COPING:  poor  ATTENTION:  poor  RELAXATION:  Patient anxious and reported poor sleep.   SELF-ESTEEM:  poor  MOTIVATION:   poor    SOCIAL SKILLS:   poor - isolating in her room  FRUSTRATION TOLERANCE:    poor - history of cutting  ATTENTION SEEKING:  history of cutting  COOPERATION:   cooperative but guarded  AFFECT:   blunt  APPEARANCE:   fair    HEARING:   no problems noted  VISION:  no problems noted  VERBAL COMMUNICATION:   guarded but no problems noted  WRITTEN COMMUNICATION:   no problems noted    COORDINATION:   no problems noted   MOBILITY:  Ambulates independently   GOALS:     Identify 2 new positive coping skills by time of discharge.

## 2023-03-15 NOTE — PLAN OF CARE
Problem: Discharge Planning  Goal: Discharge to home or other facility with appropriate resources  3/15/2023 1140 by Ander Hussein LPN  Outcome: Progressing  Flowsheets (Taken 3/15/2023 1116)  Discharge to home or other facility with appropriate resources: Identify barriers to discharge with patient and caregiver  3/14/2023 2316 by Meghan Joshi RN  Outcome: Progressing  Flowsheets  Taken 3/14/2023 2316  Discharge to home or other facility with appropriate resources: Identify barriers to discharge with patient and caregiver  Taken 3/14/2023 2302  Discharge to home or other facility with appropriate resources: Identify barriers to discharge with patient and caregiver  Note: Patient states she will return home with her  at discharge. Problem: Depression  Goal: Will be euthymic at discharge  Description: INTERVENTIONS:  1. Administer medication as ordered  2. Provide emotional support via 1:1 interaction with staff  3. Encourage involvement in milieu/groups/activities  4. Monitor for social isolation  3/15/2023 1140 by Ander Hussein LPN  Outcome: Progressing  3/14/2023 2316 by Meghan Joshi RN  Outcome: Progressing  Note: Patient states she continues to feel depressed this shift. Patient noted with a blunt affect. Problem: Anxiety  Goal: Will report anxiety at manageable levels  Description: INTERVENTIONS:  1. Administer medication as ordered  2. Teach and rehearse alternative coping skills  3.  Provide emotional support with 1:1 interaction with staff  3/15/2023 1140 by Ander Hussein LPN  Outcome: Progressing  Flowsheets (Taken 3/15/2023 1116)  Will report anxiety at manageable levels:   Administer medication as ordered   Teach and rehearse alternative coping skills   Provide emotional support with 1:1 interaction with staff  3/14/2023 2316 by Meghan Joshi RN  Outcome: Progressing  Flowsheets  Taken 3/14/2023 2316  Will report anxiety at manageable levels: Administer medication as ordered  Taken 3/14/2023 2302  Will report anxiety at manageable levels: Administer medication as ordered  Note: Patient states she continues to feel moderately depressed this shift. Problem: Sleep Disturbance  Goal: Will exhibit normal sleeping pattern  Description: INTERVENTIONS:  1. Administer medication as ordered  2. Decrease environmental stimuli, including noise, as appropriate  3. Discourage social isolation and naps during the day  3/15/2023 1140 by Chris Velázquez LPN  Outcome: Progressing  3/14/2023 2316 by Giancarlo Trent RN  Outcome: Progressing  Note: Ongoing,  Care plan reviewed with patient. Patient verbalize understanding of the plan of care and contribute to goal setting.

## 2023-03-15 NOTE — PROGRESS NOTES
Behavioral Services  Medicare Certification Upon Admission    I certify that this patient's inpatient psychiatric hospital admission is medically necessary for:    [x] (1) Treatment which could reasonably be expected to improve this patient's condition,       [x] (2) Or for diagnostic study;     AND     [x](2) The inpatient psychiatric services are provided while the individual is under the care of a physician and are included in the individualized plan of care.     Estimated length of stay/service 3-5 days    Plan for post-hospital care hc    Electronically signed by Carol Berkowitz MD on 3/15/2023 at 1:41 PM

## 2023-03-15 NOTE — PLAN OF CARE
Problem: Discharge Planning  Goal: Discharge to home or other facility with appropriate resources  Outcome: Progressing  Flowsheets  Taken 3/14/2023 2316  Discharge to home or other facility with appropriate resources: Identify barriers to discharge with patient and caregiver  Taken 3/14/2023 2302  Discharge to home or other facility with appropriate resources: Identify barriers to discharge with patient and caregiver  Note: Patient states she will return home with her  at discharge. Problem: Depression  Goal: Will be euthymic at discharge  Description: INTERVENTIONS:  1. Administer medication as ordered  2. Provide emotional support via 1:1 interaction with staff  3. Encourage involvement in milieu/groups/activities  4. Monitor for social isolation  Outcome: Progressing  Note: Patient states she continues to feel depressed this shift. Patient noted with a blunt affect. Problem: Anxiety  Goal: Will report anxiety at manageable levels  Description: INTERVENTIONS:  1. Administer medication as ordered  2. Teach and rehearse alternative coping skills  3. Provide emotional support with 1:1 interaction with staff  Outcome: Progressing  Flowsheets  Taken 3/14/2023 2316  Will report anxiety at manageable levels: Administer medication as ordered  Taken 3/14/2023 2302  Will report anxiety at manageable levels: Administer medication as ordered  Note: Patient states she continues to feel moderately depressed this shift. Problem: Sleep Disturbance  Goal: Will exhibit normal sleeping pattern  Description: INTERVENTIONS:  1. Administer medication as ordered  2. Decrease environmental stimuli, including noise, as appropriate  3. Discourage social isolation and naps during the day  Outcome: Progressing  Note: Ongoing,   Care plan reviewed with patient.   Patient does verbalize understanding of the plan of care and does not contribute to goal setting

## 2023-03-15 NOTE — PATIENT CARE CONFERENCE
[facility]  Initial Interdisciplinary Treatment Plan NOTE    REVIEW DATE AND TIME: 3/15/23 8:35    PATIENT was IN TREATMENT TEAM.  See Multidisciplinary Treatment Team sheet for participants. ADMISSION TYPE:   Admission Type: Voluntary    REASON FOR ADMISSION:  Reason for Admission: \"I'm depressed\"      Estimated Length of Stay Update:  3-5 days  Estimated Discharge Date Update: 3-5 days    Patient Strengths/Barriers  Strengths (Must Choose Two): Motivation level for treatment, Previous positive response to treatment  Barriers: Support from family  Addictive Behavior:Addictive Behavior  In the Past 3 Months, Have You Felt or Has Someone Told You That You Have a Problem With  : Eating (too much/too little), Shopping  Medical Problems:  Past Medical History:   Diagnosis Date    History of tubal ligation        EDUCATION:   Learner Progress Toward Treatment Goals: Reviewed results and recommendations of this team, Reviewed group plan and strategies, Reviewed signs, symptoms and risk of self harm and violent behavior, and Reviewed goals and plan of care    Method: Individual    Outcome: Verbalized understanding and Demonstrated Understanding    PATIENT GOALS: Improve communication and be more in control of mood and feelings    OQ TOP QUALITY PRIORITIES FOR THE PATIENT AS IDENTIFIED ON ADMISSION ADMINISTRATION:        N/A    PLAN/TREATMENT RECOMMENDATIONS UPDATE:   What is the most important thing we can help you with while you are here? See above  Who is your support system? Pt's mother and   Do you have follow-up providers? No, will need connected. Do you have the ability to pay for your medications? Yes, pt has insurance on file per EPIC  Where will you be residing when you leave the hospital? With her    Will need a return to work slip or FMLA paper completion? If admitted past Sunday then yes.       GOALS UPDATE:   Time frame for Short-Term Goals: Daily    REESE Diaz
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
## 2023-03-15 NOTE — BH NOTE
PLAN OF CARE:     Start Time: 0900  End Time:  0925    Group Topic:  Daily Goals    Group Type:   Goal Group    Intervention/Goal:  To increase support and identify daily goals    Attendance:  attended group      Affect:   blunt    Behavior:  cooperative but guarded    Response:  identified a daily goal    Daily Goal:  \"To communicate. \"    Progress:  progressing to goal
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
## 2023-03-15 NOTE — BH NOTE
This RN has reviewed and agrees with Darnell Morales LPN's data collection and has collaborated with this LPN regarding the patient's care plan.

## 2023-03-16 PROCEDURE — 1240000000 HC EMOTIONAL WELLNESS R&B

## 2023-03-16 PROCEDURE — APPSS30 APP SPLIT SHARED TIME 16-30 MINUTES: Performed by: PHYSICIAN ASSISTANT

## 2023-03-16 PROCEDURE — 6370000000 HC RX 637 (ALT 250 FOR IP): Performed by: PHYSICIAN ASSISTANT

## 2023-03-16 PROCEDURE — 6370000000 HC RX 637 (ALT 250 FOR IP): Performed by: PSYCHIATRY & NEUROLOGY

## 2023-03-16 RX ORDER — TRAZODONE HYDROCHLORIDE 50 MG/1
50 TABLET ORAL NIGHTLY PRN
Qty: 30 TABLET | Refills: 0 | Status: CANCELLED | OUTPATIENT
Start: 2023-03-16

## 2023-03-16 RX ADMIN — CETIRIZINE HYDROCHLORIDE 10 MG: 10 TABLET, FILM COATED ORAL at 09:04

## 2023-03-16 RX ADMIN — PAROXETINE 30 MG: 30 TABLET, FILM COATED ORAL at 09:04

## 2023-03-16 RX ADMIN — TRAZODONE HYDROCHLORIDE 50 MG: 50 TABLET ORAL at 20:29

## 2023-03-16 ASSESSMENT — PAIN DESCRIPTION - FREQUENCY: FREQUENCY: INTERMITTENT

## 2023-03-16 ASSESSMENT — PAIN SCALES - GENERAL: PAINLEVEL_OUTOF10: 0

## 2023-03-16 NOTE — BH NOTE
Group Therapy Note    Date: 3/15/2023  Start Time: 2000  End Time:  2020  Number of Participants: 1    Type of Group: Wrap-Up/Relaxation    Wellness Binder Information  Module Name:  None  Session Number:  None    Patient's Goal:  To communicate better    Notes:  Ongoing    Status After Intervention:  Improved    Participation Level: Interactive    Participation Quality: Appropriate      Speech:  normal      Thought Process/Content: Logical      Affective Functioning: Blunted      Mood: euthymic      Level of consciousness:  Oriented x4      Response to Learning: Capable of insight      Endings: None Reported    Modes of Intervention: Education      Discipline Responsible: Registered Nurse      Signature:   Julio Alvarez RN

## 2023-03-16 NOTE — PROGRESS NOTES
Department of Psychiatry  Progress Note     Chief Complaint:  suicidal ideation     PROGRESS:  Hubert Kitchen, who prefers to be called [facility] is seen in her room. She is receptive to interaction. Diane reports she is doing great today. Appears brighter on examination today. She says her day was really good yesterday. She had visits from her mom, sister and . Mood is really good. When asked what changed for her, she states she journaled, did some drawing and made a friend with her roommate. She rates her mood 9 out of 10 with 10 being the best.  States yesterday was an 8. Her depression is better. She denies any active suicidal ideation at this time and contracts for safety on the unit. Denies any urges to cut herself. She is feeling less hopeless and helpless about her situation. When asked what she is going to do about her marriage, she said \"whatever happens happens. I cannot control him. I can only control myself. \"  She denies feeling angry on the unit. She has not had any outbursts. She also denies feeling impulsive. She slept pretty good last night. Staff reported she slept 6.5 hours broken. Appetite has been good. Says she ate a lot yesterday. She has been compliant with her medications. Denies any side effects. She has been on the unit at times but has not been attending groups today. She did attend some yesterday. Per Robe Becerril RN this afternoon: \"patient is requesting to be discharged tomorrow d/t her autistic son is not eating since she is here. Patient states she feels better and plans to do a walk in appointment for [facility] and she has the information to go there. \"     Suicidal ideations: denies active suicidal ideation  Compliance with medications: good   Medication side effects: absent  ROS: Patient has new complaints:  no  Sleep quality: 6.5 hours broken last night per staff  Attending groups: none today; attended some yesterday      OBJECTIVE      Medications  Current Facility-Administered Medications: PARoxetine (PAXIL) tablet 30 mg, 30 mg, Oral, QAM  cetirizine (ZYRTEC) tablet 10 mg, 10 mg, Oral, Daily  nicotine (NICODERM CQ) 21 MG/24HR 1 patch, 1 patch, TransDERmal, Daily  acetaminophen (TYLENOL) tablet 650 mg, 650 mg, Oral, Q4H PRN  ibuprofen (ADVIL;MOTRIN) tablet 400 mg, 400 mg, Oral, Q6H PRN  magnesium hydroxide (MILK OF MAGNESIA) 400 MG/5ML suspension 30 mL, 30 mL, Oral, Daily PRN  aluminum & magnesium hydroxide-simethicone (MAALOX) 200-200-20 MG/5ML suspension 30 mL, 30 mL, Oral, Q6H PRN  traZODone (DESYREL) tablet 50 mg, 50 mg, Oral, Nightly PRN  hydrOXYzine HCl (ATARAX) tablet 10 mg, 10 mg, Oral, TID PRN     Physical     height is 5' 4\" (1.626 m) and weight is 210 lb (95.3 kg). Her tympanic temperature is 97.3 °F (36.3 °C). Her blood pressure is 125/70 and her pulse is 77. Her respiration is 16 and oxygen saturation is 99%.    Lab Results   Component Value Date    WBC 14.2 (H) 03/14/2023    HGB 14.2 03/14/2023    HCT 47.1 (H) 03/14/2023     03/14/2023    ALT 25 03/14/2023    AST 16 03/14/2023     03/14/2023    K 4.2 03/14/2023     03/14/2023    CREATININE 0.5 03/14/2023    BUN 11 03/14/2023    CO2 25 03/14/2023    TSH 0.529 03/14/2023          Mental Status Exam:   Level of consciousness:  awake  Appearance:  well-appearing, hospital attire, seated on side of bed, good grooming, and good hygiene  Behavior/Motor:  no abnormalities noted  Attitude toward examiner:  cooperative, attentive, and good eye contact  Speech:  spontaneous, normal rate, and normal volume  Mood: \"Really good\" per patient  Affect: Reactive  Thought processes:  linear, goal directed, and coherent  Thought content:  Denies homicidal ideation  Suicidal Ideation:  denies active suicidal ideation  Delusions:  no evidence of delusions  Perceptual Disturbance:  denies any perceptual disturbance  Cognition: Patient is oriented to person, place, time and situation  Concentration: clinically adequate  Memory: intact  Insight & Judgement: fair       ASSESSMENT     MDD (major depressive disorder), recurrent severe, without psychosis ([facility] [address]   Anxiety unspecified    PLAN    Patient's symptoms show some improvement today  Medication adjustments as discussed with the attending physician: Continue current medication regimen and observe  Side effects and risks versus benefits of medications were discussed with the patient   Attempt to develop insight, psycho-education and supportive therapy conducted. Probable discharge: 2-3 days   [facility], daily while on inpatient unit    Electronically signed by Jose Armando Severino PA-C on 3/16/2023 at 12:50 PM Reviewed patient's current plan of care and vital signs with nursing staff. **This report has been created using voice recognition software. It may contain minor errors which are inherent in voice recognition technology. **                                        Psychiatry Attending Attestation     I assessed this patient and reviewed the case and plan of care with Jose Armando Severino PA-C. I have reviewed the above documentation and I agree with the findings and treatment plan with the following updates. Patient notes her suicidal thoughts are somewhat better today. Reports some feelings of depression still continue to bother her. Reports that her suicidal thoughts are only worse at night. Reports she is tolerating medication adjustments well and denies any side effect from the Paxil. Discussed with her about possible discharge on Saturday if he continues to improve and she is agreeable to the plan. PLAN  Patient s symptoms   are improving  Will continue same medication and observe  Attempt to develop insight  Psycho-education conducted. Supportive Therapy conducted.   Probable discharge is TBD  Follow-up TBD    More than 16 mins of the session was spent doing Supportive psychotherapy and coordinating care. Session lasted for over 30 mins. Patient was evaluated by Jorge L Abad PA-C on the unit in person and I evaluated patient as Tele visit. This Virtual Visit was conducted with patient's consent. The patient is located in a state where I am licensed to provide care. Letcher Boeck is a 33 y.o. female being evaluated by a Virtual Visit (video visit) encounter to address concerns as mentioned above. A caregiver was present in the room along with the patient. Patient is present at [address] [place] [address] and I am physically present [place]     --Fabby Cruz MD on 3/16/2023 at 5:13 PM    An electronic signature was used to authenticate this note. **This report has been created using voice recognition software. It may contain minor errors which are inherent in voice recognition technology. **

## 2023-03-16 NOTE — PROGRESS NOTES
Patient is requesting to be discharged tomorrow d/t her autistic son is not eating since she is here.  Patient states she feels better and plans to do a walk in appointment for [facility] and she has the information to go there

## 2023-03-16 NOTE — PLAN OF CARE
Problem: Discharge Planning  Goal: Discharge to home or other facility with appropriate resources  3/15/2023 2235 by Anna Snell RN  Outcome: Progressing  Flowsheets  Taken 3/15/2023 2235  Discharge to home or other facility with appropriate resources: Identify barriers to discharge with patient and caregiver  Taken 3/15/2023 2217  Discharge to home or other facility with appropriate resources: Identify barriers to discharge with patient and caregiver  Note: Patient states she plans to return home with her family at discharge. 3/15/2023 1140 by Marlyn Hsu LPN  Outcome: Progressing  Flowsheets (Taken 3/15/2023 1116)  Discharge to home or other facility with appropriate resources: Identify barriers to discharge with patient and caregiver     Problem: Depression  Goal: Will be euthymic at discharge  Description: INTERVENTIONS:  1. Administer medication as ordered  2. Provide emotional support via 1:1 interaction with staff  3. Encourage involvement in milieu/groups/activities  4. Monitor for social isolation  3/15/2023 2235 by Anna Snell RN  Outcome: Progressing  Note: Patient denies any feelings of depression at present. Patient noted with a blunt affect and brightens with interaction at times. 3/15/2023 1140 by Marlyn Hsu LPN  Outcome: Progressing     Problem: Anxiety  Goal: Will report anxiety at manageable levels  Description: INTERVENTIONS:  1. Administer medication as ordered  2. Teach and rehearse alternative coping skills  3. Provide emotional support with 1:1 interaction with staff  3/15/2023 2235 by Anna Snell RN  Outcome: Progressing  Flowsheets  Taken 3/15/2023 2235  Will report anxiety at manageable levels: Teach and rehearse alternative coping skills  Taken 3/15/2023 2217  Will report anxiety at manageable levels: Teach and rehearse alternative coping skills  Note: Patient denies any feelings of anxiety at present.   3/15/2023 1140 by Marlyn Hsu LPN  Outcome: Progressing  Flowsheets (Taken 3/15/2023 1116)  Will report anxiety at manageable levels:   Administer medication as ordered   Teach and rehearse alternative coping skills   Provide emotional support with 1:1 interaction with staff     Problem: Sleep Disturbance  Goal: Will exhibit normal sleeping pattern  Description: INTERVENTIONS:  1. Administer medication as ordered  2. Decrease environmental stimuli, including noise, as appropriate  3. Discourage social isolation and naps during the day  3/15/2023 2235 by Tor Price RN  Outcome: Progressing  Note: Patient  states she feels her sleep pattern is starting to improve. 3/15/2023 1140 by Christal Michaud LPN  Outcome: Progressing     Problem: Coping  Goal: Pt/Family able to verbalize concerns and demonstrate effective coping strategies  Description: INTERVENTIONS:  1. Assist patient/family to identify coping skills, available support systems and cultural and spiritual values  2. Provide emotional support, including active listening and acknowledgement of concerns of patient and caregivers  3. Reduce environmental stimuli, as able  4. Instruct patient/family in relaxation techniques, as appropriate  5. Assess for spiritual pain/suffering and initiate Spiritual Care, Psychosocial Clinical Specialist consults as needed  3/15/2023 2235 by Tor Price RN  Outcome: Progressing  Flowsheets  Taken 3/15/2023 2235  Patient/family able to verbalize anxieties, fears, and concerns, and demonstrate effective coping: Provide emotional support, including active listening and acknowledgement of concerns of patient and caregivers  Taken 3/15/2023 2217  Patient/family able to verbalize anxieties, fears, and concerns, and demonstrate effective coping: Reduce environmental stimuli, as able  Note: Patient is able to verbalize needs and concerns appropriately.   3/15/2023 1400 by Yuridia Kan  Outcome: Progressing     Problem: Pain  Goal: Verbalizes/displays adequate comfort level or baseline comfort level  Outcome: Progressing  Flowsheets (Taken 3/15/2023 2593)  Verbalizes/displays adequate comfort level or baseline comfort level: Assess pain using appropriate pain scale  Note: Patient states intermittent low back aching of a 7. Care plan reviewed with patient.   Patient does verbalize understanding of the plan of care and does contribute to goal setting

## 2023-03-16 NOTE — PROGRESS NOTES
Group Therapy Note    Date: 3/16/2023  Start Time: 36  End Time:  1400  Number of Participants: 4    Type of Group: Psychotherapy        Notes:  Pt presented for group. The group was provided information on the stress management as the group explored reactive vs proactive stress management skills. The group discussed ways in which they may redefine stress and anxiety so to utilize their coping skills much earlier when they will be effective. Pt participated in a stress management activity. Status After Intervention:  Improved    Participation Level:  Active Listener and Interactive    Participation Quality: Appropriate, Attentive, Sharing, and Supportive      Speech:  normal      Thought Process/Content: Logical  Linear      Affective Functioning: Congruent      Mood: euthymic      Level of consciousness:  Alert, Oriented x4, and Attentive      Response to Learning: Able to verbalize current knowledge/experience, Able to verbalize/acknowledge new learning, Able to retain information, Capable of insight, Able to change behavior, and Progressing to goal      Endings: None Reported    Modes of Intervention: Education, Support, Socialization, Exploration, Clarifying, Problem-solving, and Activity      Discipline Responsible: /Counselor      Signature:  REESE Mccarthy

## 2023-03-16 NOTE — PROGRESS NOTES
Group Therapy Note    Date: 3/16/2023  Start Time: 36  End Time:  1400  Number of Participants: 4    Type of Group: Psychotherapy        Notes:  Pt presented for group. The group was provided information on the stress management as the group explored reactive vs proactive stress management skills. The group discussed ways in which they may redefine stress and anxiety so to utilize their coping skills much earlier when they will be effective. Pt participated in a stress management activity. Status After Intervention:  Improved    Participation Level:  Active Listener and Interactive    Participation Quality: Appropriate, Attentive, Sharing, and Supportive      Speech:  normal      Thought Process/Content: Logical  Linear      Affective Functioning: Congruent      Mood: euthymic      Level of consciousness:  Alert, Oriented x4, and Attentive      Response to Learning: Able to verbalize current knowledge/experience, Able to verbalize/acknowledge new learning, Able to retain information, Capable of insight, Able to change behavior, and Progressing to goal      Endings: None Reported    Modes of Intervention: Education, Support, Socialization, Exploration, Clarifying, Problem-solving, and Activity      Discipline Responsible: /Counselor      Signature:  REESE Jeff

## 2023-03-16 NOTE — PROGRESS NOTES
[facility]  Day 3 Interdisciplinary Treatment Plan NOTE    Review Date & Time: 03/16/23  1105    Patient was in treatment team    Admission Type:   Admission Type: Voluntary    Reason for admission:  Reason for Admission: \"I'm depressed\"  Estimated Length of Stay Update:  03/20/23  Estimated Discharge Date Update: 1-3 days    Patient Strengths/Barriers  Strengths (Must Choose Two): Motivation level for treatment, Previous positive response to treatment  Barriers: Support from family  Addictive Behavior:Addictive Behavior  In the Past 3 Months, Have You Felt or Has Someone Told You That You Have a Problem With  : Eating (too much/too little), Shopping  Medical Problems:  Past Medical History:   Diagnosis Date    History of tubal ligation        Risk:  Fall Risk   Bhanu Scale Bhanu Scale Score: 21    Status EXAM:   Mental Status and Behavioral Exam  Normal: Yes  Level of Assistance: Independent/Self  Facial Expression: Brightened  Affect: Blunt  Level of Consciousness: Alert  Frequency of Checks: 4 times per hour, close  Mood:Normal: Yes  Mood: Depressed, Anxious, Sad  Motor Activity:Normal: Yes  Motor Activity: Decreased  Eye Contact: Fair  Observed Behavior: Cooperative, Friendly  Sexual Misconduct History: Current - no  Preception: [place] to person, [place] to time, [place] to place, [place] to situation  Attention:Normal: Yes  Thought Processes: Circumstantial  Thought Content:Normal: Yes  Depression Symptoms: No problems reported or observed. Anxiety Symptoms: No problems reported or observed. Susan Symptoms: No problems reported or observed.   Hallucinations: None  Delusions: No  Delusions: Paranoid  Memory:Normal: Yes  Insight and Judgment: No  Insight and Judgment: Poor judgment, Poor insight    Daily Assessment Last Entry:   Daily Sleep (WDL): Within Defined Limits            Daily Nutrition (WDL): Within Defined Limits  Level of Assistance: Independent/Self    Patient Monitoring:  Frequency of Checks: 4 times per hour, close    Psychiatric Symptoms:   Depression Symptoms  Depression Symptoms: No problems reported or observed. Anxiety Symptoms  Anxiety Symptoms: No problems reported or observed. Susan Symptoms  Susan Symptoms: No problems reported or observed. Suicide Risk CSSR-S:  1) Within the past month, have you wished you were dead or wished you could go to sleep and not wake up? : Yes  2) Have you actually had any thoughts of killing yourself? : No  3) Have you been thinking about how you might kill yourself? : No  5) Have you started to work out or worked out the details of how to kill yourself? Do you intend to carry out this plan? : No  6) Have you ever done anything, started to do anything, or prepared to do anything to end your life?: No    EDUCATION:   Learner Progress Toward Treatment Goals: Reviewed results and recommendations of this team, Reviewed group plan and strategies, Reviewed signs, symptoms and risk of self harm and violent behavior, and Reviewed goals and plan of care    Method: Individual    Outcome: Verbalized understanding and Demonstrated Understanding    PATIENT GOALS: Improve communication skills and be more in control of mood and feelings. OQ TOP QUALITY PRIORITIES FOR THE PATIENT AS IDENTIFIED ON ADMISSION ADMINISTRATION:        Suicide      PLAN/TREATMENT RECOMMENDATIONS UPDATE:  How are you progressing toward meeting your main treatment goal? Patient discussed improvement of communication skills by talking regularly with roommate. Patient stated that she feels ready for discharge and is requesting discharge for tomorrow. 2.  Are there discharge barriers/lingering problems that need to be addressed? Patient agrees to follow up with [facility]. 3.  Do you have the ability to pay for your medications? [facility]      4. How is your group participation? Been attending.      GOALS UPDATE:   Time frame for Short-Term Goals: Daily      REESE Gomez Daily

## 2023-03-16 NOTE — PLAN OF CARE
Patient has not attended any of the groups so far today but came out of her room to color so she is working toward demonstrating effective coping strategies. Patient will be encouraged to attend all of the groups on the unit daily during the rest of her hospital stay. Problem: Coping  Goal: Pt/Family able to verbalize concerns and demonstrate effective coping strategies  3/16/2023 1138 by Uche Abernathy  Outcome: Not Progressing  3/15/2023 2235 by Goldie Fitzgerald RN  Outcome: Progressing  Flowsheets  Taken 3/15/2023 2235  Patient/family able to verbalize anxieties, fears, and concerns, and demonstrate effective coping: Provide emotional support, including active listening and acknowledgement of concerns of patient and caregivers  Taken 3/15/2023 2217  Patient/family able to verbalize anxieties, fears, and concerns, and demonstrate effective coping: Reduce environmental stimuli, as able  Note: Patient is able to verbalize needs and concerns appropriately.

## 2023-03-16 NOTE — PROGRESS NOTES
Discharge Planning- Patient is to go to [facility] for a walk-in appointment.  Walk in times are on Monday, Wednesday, Friday from 9:30 am - 11:30 am, Tuesday & Thursday from 2:30 pm - 3:30 pm.

## 2023-03-16 NOTE — PLAN OF CARE
Problem: Discharge Planning  Goal: Discharge to home or other facility with appropriate resources  3/16/2023 1149 by Satnam Daniels RN  Outcome: Progressing  Flowsheets (Taken 3/16/2023 1130)  Discharge to home or other facility with appropriate resources: Identify barriers to discharge with patient and caregiver  Note: Discharge planning in process. Patient reports she will be returning home with her family. Following up with [facility] for a walk in appointment. Problem: Depression  Goal: Will be euthymic at discharge  Description: INTERVENTIONS:  1. Administer medication as ordered  2. Provide emotional support via 1:1 interaction with staff  3. Encourage involvement in milieu/groups/activities  4. Monitor for social isolation  3/16/2023 1149 by Satnam Daniels RN  Outcome: Progressing  Note: Rates her mood 8 out of 10 with 10 being the best mood. Denies anxiety and depression. Patient however appears depressed. Isolates to her room. Does not attend groups. Problem: Anxiety  Goal: Will report anxiety at manageable levels  Description: INTERVENTIONS:  1. Administer medication as ordered  2. Teach and rehearse alternative coping skills  3. Provide emotional support with 1:1 interaction with staff  3/16/2023 1149 by Satnam Daniels RN  Outcome: Progressing  Flowsheets (Taken 3/16/2023 1130)  Will report anxiety at manageable levels: Provide emotional support with 1:1 interaction with staff  Note: Denies any anxiety. Problem: Sleep Disturbance  Goal: Will exhibit normal sleeping pattern  Description: INTERVENTIONS:  1. Administer medication as ordered  2. Decrease environmental stimuli, including noise, as appropriate  3. Discourage social isolation and naps during the day  3/16/2023 1149 by Satnam Daniels RN  Outcome: Progressing  Note: Patient slept 6.5 hours broken last night.       Problem: Coping  Goal: Pt/Family able to verbalize concerns and demonstrate effective coping strategies  Description: INTERVENTIONS:  1. Assist patient/family to identify coping skills, available support systems and cultural and spiritual values  2. Provide emotional support, including active listening and acknowledgement of concerns of patient and caregivers  3. Reduce environmental stimuli, as able  4. Instruct patient/family in relaxation techniques, as appropriate  5. Assess for spiritual pain/suffering and initiate Spiritual Care, Psychosocial Clinical Specialist consults as needed  3/16/2023 1149 by Luan Hampton RN  Outcome: Progressing  Note: Does not verbalize any coping skills at this time. Problem: Pain  Goal: Verbalizes/displays adequate comfort level or baseline comfort level  3/16/2023 1149 by Luan Hampton RN  Outcome: Progressing  Note: Denies any pain at this time. Problem: Coping  Goal: Pt/Family able to verbalize concerns and demonstrate effective coping strategies  Description: INTERVENTIONS:  1. Assist patient/family to identify coping skills, available support systems and cultural and spiritual values  2. Provide emotional support, including active listening and acknowledgement of concerns of patient and caregivers  3. Reduce environmental stimuli, as able  4. Instruct patient/family in relaxation techniques, as appropriate  5. Assess for spiritual pain/suffering and initiate Spiritual Care, Psychosocial Clinical Specialist consults as needed  3/16/2023 1149 by Luan Hampton, WINNIE  Outcome: Progressing  Note: Does not verbalize any coping skills at this time.    3/16/2023 1138 by Roxie Hardy  Outcome: Not Progressing  Flowsheets (Taken 3/16/2023 1130 by Luan Hampton, RN)  Patient/family able to verbalize anxieties, fears, and concerns, and demonstrate effective coping: Provide emotional support, including active listening and acknowledgement of concerns of patient and caregivers  3/15/2023 2235 by Danae Anne RN  Outcome: Progressing  Flowsheets  Taken 3/15/2023 2235  Patient/family able to verbalize anxieties, fears, and concerns, and demonstrate effective coping: Provide emotional support, including active listening and acknowledgement of concerns of patient and caregivers  Taken 3/15/2023 2217  Patient/family able to verbalize anxieties, fears, and concerns, and demonstrate effective coping: Reduce environmental stimuli, as able  Note: Patient is able to verbalize needs and concerns appropriately. Care plan reviewed with patient.   Patient does verbalize understanding of the plan of care and does not contribute to goal setting

## 2023-03-17 PROCEDURE — APPSS30 APP SPLIT SHARED TIME 16-30 MINUTES: Performed by: PHYSICIAN ASSISTANT

## 2023-03-17 PROCEDURE — 1240000000 HC EMOTIONAL WELLNESS R&B

## 2023-03-17 PROCEDURE — 6370000000 HC RX 637 (ALT 250 FOR IP): Performed by: PHYSICIAN ASSISTANT

## 2023-03-17 PROCEDURE — 6370000000 HC RX 637 (ALT 250 FOR IP): Performed by: PSYCHIATRY & NEUROLOGY

## 2023-03-17 RX ADMIN — TRAZODONE HYDROCHLORIDE 50 MG: 50 TABLET ORAL at 21:00

## 2023-03-17 RX ADMIN — PAROXETINE 30 MG: 30 TABLET, FILM COATED ORAL at 08:40

## 2023-03-17 RX ADMIN — CETIRIZINE HYDROCHLORIDE 10 MG: 10 TABLET, FILM COATED ORAL at 08:40

## 2023-03-17 RX ADMIN — IBUPROFEN 400 MG: 400 TABLET, FILM COATED ORAL at 20:14

## 2023-03-17 RX ADMIN — IBUPROFEN 400 MG: 400 TABLET, FILM COATED ORAL at 11:33

## 2023-03-17 ASSESSMENT — PAIN DESCRIPTION - PAIN TYPE: TYPE: ACUTE PAIN

## 2023-03-17 ASSESSMENT — PAIN DESCRIPTION - FREQUENCY: FREQUENCY: CONTINUOUS

## 2023-03-17 ASSESSMENT — PAIN DESCRIPTION - LOCATION
LOCATION: THROAT
LOCATION: THROAT

## 2023-03-17 ASSESSMENT — PAIN SCALES - GENERAL
PAINLEVEL_OUTOF10: 7
PAINLEVEL_OUTOF10: 0
PAINLEVEL_OUTOF10: 7

## 2023-03-17 ASSESSMENT — PAIN DESCRIPTION - ORIENTATION: ORIENTATION: LOWER

## 2023-03-17 ASSESSMENT — PAIN DESCRIPTION - DESCRIPTORS: DESCRIPTORS: ACHING

## 2023-03-17 ASSESSMENT — PAIN - FUNCTIONAL ASSESSMENT: PAIN_FUNCTIONAL_ASSESSMENT: ACTIVITIES ARE NOT PREVENTED

## 2023-03-17 ASSESSMENT — PAIN DESCRIPTION - ONSET: ONSET: ON-GOING

## 2023-03-17 NOTE — PLAN OF CARE
Problem: Discharge Planning  Goal: Discharge to home or other facility with appropriate resources  3/16/2023 2342 by Marlyn Hsu LPN  Outcome: Progressing  Flowsheets (Taken 3/16/2023 2332)  Discharge to home or other facility with appropriate resources:   Identify barriers to discharge with patient and caregiver   Arrange for needed discharge resources and transportation as appropriate   Identify discharge learning needs (meds, wound care, etc)  3/16/2023 1149 by Makeda De La Fuente, RN  Outcome: Progressing  Flowsheets (Taken 3/16/2023 1130)  Discharge to home or other facility with appropriate resources: Identify barriers to discharge with patient and caregiver  Note: Discharge planning in process. Patient reports she will be returning home with her family. Following up with [facility]. Problem: Depression  Goal: Will be euthymic at discharge  Description: INTERVENTIONS:  1. Administer medication as ordered  2. Provide emotional support via 1:1 interaction with staff  3. Encourage involvement in milieu/groups/activities  4. Monitor for social isolation  3/16/2023 2342 by Marlyn Hsu LPN  Outcome: Progressing  3/16/2023 1149 by Makeda De La Fuente, RN  Outcome: Progressing  Note: Rates her mood 8 out of 10 with 10 being the best mood. Denies anxiety and depression. Patient however appears depressed. Isolates to her room. Does not attend groups. Problem: Anxiety  Goal: Will report anxiety at manageable levels  Description: INTERVENTIONS:  1. Administer medication as ordered  2. Teach and rehearse alternative coping skills  3.  Provide emotional support with 1:1 interaction with staff  3/16/2023 2342 by Marlyn Hsu LPN  Outcome: Progressing  Flowsheets (Taken 3/16/2023 2332)  Will report anxiety at manageable levels:   Administer medication as ordered   Teach and rehearse alternative coping skills   Provide emotional support with 1:1 interaction with staff  3/16/2023 1149 by Leydi Tanner WINNIE Stoll  Outcome: Progressing  Flowsheets (Taken 3/16/2023 1130)  Will report anxiety at manageable levels: Provide emotional support with 1:1 interaction with staff  Note: Denies any anxiety. Problem: Sleep Disturbance  Goal: Will exhibit normal sleeping pattern  Description: INTERVENTIONS:  1. Administer medication as ordered  2. Decrease environmental stimuli, including noise, as appropriate  3. Discourage social isolation and naps during the day  3/16/2023 2342 by Misha Elliott LPN  Outcome: Progressing  3/16/2023 1149 by Trev Sullivan RN  Outcome: Progressing  Note: Patient slept 6.5 hours broken last night. Problem: Coping  Goal: Pt/Family able to verbalize concerns and demonstrate effective coping strategies  Description: INTERVENTIONS:  1. Assist patient/family to identify coping skills, available support systems and cultural and spiritual values  2. Provide emotional support, including active listening and acknowledgement of concerns of patient and caregivers  3. Reduce environmental stimuli, as able  4. Instruct patient/family in relaxation techniques, as appropriate  5. Assess for spiritual pain/suffering and initiate Spiritual Care, Psychosocial Clinical Specialist consults as needed  3/16/2023 2342 by Misha Elliott LPN  Outcome: Progressing  Flowsheets (Taken 3/16/2023 2332)  Patient/family able to verbalize anxieties, fears, and concerns, and demonstrate effective coping:   Assist patient/family to identify coping skills, available support systems and cultural and spiritual values   Provide emotional support, including active listening and acknowledgement of concerns of patient and caregivers   Reduce environmental stimuli, as able  3/16/2023 1149 by Trev Sullivan RN  Outcome: Progressing  Note: Does not verbalize any coping skills at this time.    3/16/2023 1138 by Amira Zhou  Outcome: Not Progressing  Flowsheets (Taken 3/16/2023 1130 by Trev Sullivan RN)  Patient/family able to verbalize anxieties, fears, and concerns, and demonstrate effective coping: Provide emotional support, including active listening and acknowledgement of concerns of patient and caregivers     Problem: Pain  Goal: Verbalizes/displays adequate comfort level or baseline comfort level  3/16/2023 2342 by Kassidy Cutler LPN  Outcome: Progressing  Flowsheets (Taken 3/16/2023 2327)  Verbalizes/displays adequate comfort level or baseline comfort level:   Encourage patient to monitor pain and request assistance   Assess pain using appropriate pain scale   Administer analgesics based on type and severity of pain and evaluate response  3/16/2023 1149 by Dedrick Mohan RN  Outcome: Progressing  Note: Denies any pain at this time. Problem: Coping  Goal: Pt/Family able to verbalize concerns and demonstrate effective coping strategies  Description: INTERVENTIONS:  1. Assist patient/family to identify coping skills, available support systems and cultural and spiritual values  2. Provide emotional support, including active listening and acknowledgement of concerns of patient and caregivers  3. Reduce environmental stimuli, as able  4. Instruct patient/family in relaxation techniques, as appropriate  5. Assess for spiritual pain/suffering and initiate Spiritual Care, Psychosocial Clinical Specialist consults as needed  3/16/2023 2342 by Kassidy Cutler LPN  Outcome: Progressing  Flowsheets (Taken 3/16/2023 2332)  Patient/family able to verbalize anxieties, fears, and concerns, and demonstrate effective coping:   Assist patient/family to identify coping skills, available support systems and cultural and spiritual values   Provide emotional support, including active listening and acknowledgement of concerns of patient and caregivers   Reduce environmental stimuli, as able  3/16/2023 1149 by Dedrick Mohan RN  Outcome: Progressing  Note: Does not verbalize any coping skills at this time. 3/16/2023 1138 by Amira Zhou  Outcome: Not Progressing  Flowsheets (Taken 3/16/2023 1130 by Trev Sullivan RN)  Patient/family able to verbalize anxieties, fears, and concerns, and demonstrate effective coping: Provide emotional support, including active listening and acknowledgement of concerns of patient and caregivers  Care plan reviewed with patient. Patient verbalize understanding of the plan of care and contribute to goal setting.

## 2023-03-17 NOTE — PLAN OF CARE
Problem: Discharge Planning  Goal: Discharge to home or other facility with appropriate resources  Outcome: Progressing  Flowsheets (Taken 3/17/2023 1330)  Discharge to home or other facility with appropriate resources: Identify barriers to discharge with patient and caregiver  Note: Discharge planning in process. Problem: Depression  Goal: Will be euthymic at discharge  Description: INTERVENTIONS:  1. Administer medication as ordered  2. Provide emotional support via 1:1 interaction with staff  3. Encourage involvement in milieu/groups/activities  4. Monitor for social isolation  Outcome: Progressing  Note: Rates her mood 10 out of 10 with 10 being the best mood. Denies anxiety and depression. Problem: Anxiety  Goal: Will report anxiety at manageable levels  Description: INTERVENTIONS:  1. Administer medication as ordered  2. Teach and rehearse alternative coping skills  3. Provide emotional support with 1:1 interaction with staff  Outcome: Progressing  Flowsheets (Taken 3/17/2023 1330)  Will report anxiety at manageable levels: Provide emotional support with 1:1 interaction with staff  Note: Denies anxiety. Problem: Sleep Disturbance  Goal: Will exhibit normal sleeping pattern  Description: INTERVENTIONS:  1. Administer medication as ordered  2. Decrease environmental stimuli, including noise, as appropriate  3. Discourage social isolation and naps during the day  Outcome: Progressing  Note: Patient slept 8 hours continuously last night. Problem: Coping  Goal: Pt/Family able to verbalize concerns and demonstrate effective coping strategies  Description: INTERVENTIONS:  1. Assist patient/family to identify coping skills, available support systems and cultural and spiritual values  2. Provide emotional support, including active listening and acknowledgement of concerns of patient and caregivers  3. Reduce environmental stimuli, as able  4.  Instruct patient/family in relaxation techniques, as appropriate  5. Assess for spiritual pain/suffering and initiate Spiritual Care, Psychosocial Clinical Specialist consults as needed  Outcome: Progressing  Flowsheets (Taken 3/17/2023 1330)  Patient/family able to verbalize anxieties, fears, and concerns, and demonstrate effective coping: Provide emotional support, including active listening and acknowledgement of concerns of patient and caregivers  Note: Does not verbalize any coping skills at this time. Problem: Pain  Goal: Verbalizes/displays adequate comfort level or baseline comfort level  Outcome: Progressing  Note: Reports having a sore throat and rating the pain 7 out of 10 with 10 being the worse pain. Care plan reviewed with patient.   Patient does verbalize understanding of the plan of care and does not contribute to goal setting

## 2023-03-17 NOTE — PROGRESS NOTES
Department of Psychiatry  Progress Note     Chief Complaint:  suicidal ideation      PROGRESS:  Tesha Bautista, who prefers to be called Scotty Sarahy is seen seated in bed. She is receptive to interaction. Diane reports she is doing great today. Appears brighter on examination today. She says her day was good yesterday. She had visits from her  and kids. She reports her oldest son told her he doesn't want to eat anything until she gets home. She is worried about this. Mood is really good. When asked what changed for her, she states she has been going to groups and has been working on some coping skills for stress management. She rates her mood 10 out of 10 with 10 being the best.  Her depression is better. She denies any active suicidal ideation at this time and contracts for safety on the unit. Denies any urges to cut herself. She is feeling less hopeless and helpless about her situation. She denies feeling angry on the unit. She has not had any outbursts. She also denies feeling impulsive. She slept pretty good last night. Staff reported she slept 8 hours continuous last night. Appetite has been good. She has been compliant with her medications. Denies any side effects. She has been on the unit at times and has not been attending groups. .     Suicidal ideations: denies active suicidal ideation  Compliance with medications: good   Medication side effects: absent  ROS: Patient has new complaints:  no  Sleep quality: 8 hours continuous last night per staff  Attending groups: some         OBJECTIVE      Medications  Current Facility-Administered Medications: PARoxetine (PAXIL) tablet 30 mg, 30 mg, Oral, QAM  cetirizine (ZYRTEC) tablet 10 mg, 10 mg, Oral, Daily  nicotine (NICODERM CQ) 21 MG/24HR 1 patch, 1 patch, TransDERmal, Daily  acetaminophen (TYLENOL) tablet 650 mg, 650 mg, Oral, Q4H PRN  ibuprofen (ADVIL;MOTRIN) tablet 400 mg, 400 mg, Oral, Q6H PRN  magnesium hydroxide (MILK OF MAGNESIA) 400 MG/5ML suspension 30 mL, 30 mL, Oral, Daily PRN  aluminum & magnesium hydroxide-simethicone (MAALOX) 200-200-20 MG/5ML suspension 30 mL, 30 mL, Oral, Q6H PRN  traZODone (DESYREL) tablet 50 mg, 50 mg, Oral, Nightly PRN  hydrOXYzine HCl (ATARAX) tablet 10 mg, 10 mg, Oral, TID PRN     Physical     height is 5' 4\" (1.626 m) and weight is 210 lb (95.3 kg). Her tympanic temperature is 97 °F (36.1 °C). Her blood pressure is 121/76 and her pulse is 86. Her respiration is 18 and oxygen saturation is 99%.    Lab Results   Component Value Date    WBC 14.2 (H) 03/14/2023    HGB 14.2 03/14/2023    HCT 47.1 (H) 03/14/2023     03/14/2023    ALT 25 03/14/2023    AST 16 03/14/2023     03/14/2023    K 4.2 03/14/2023     03/14/2023    CREATININE 0.5 03/14/2023    BUN 11 03/14/2023    CO2 25 03/14/2023    TSH 0.529 03/14/2023          Mental Status Exam:   Level of consciousness:  awake  Appearance:  well-appearing, hospital attire, seated in bed, good grooming, and good hygiene  Behavior/Motor:  no abnormalities noted  Attitude toward examiner:  cooperative, attentive, and good eye contact  Speech:  spontaneous, normal rate, and normal volume  Mood: \"good\" per patient  Affect: Reactive  Thought processes:  linear, goal directed, and coherent  Thought content:  Denies homicidal ideation  Suicidal Ideation:  denies active suicidal ideation  Delusions:  no evidence of delusions  Perceptual Disturbance:  denies any perceptual disturbance  Cognition: Patient is oriented to person, place, time and situation  Concentration: clinically adequate  Memory: intact  Insight & Judgement: fair          ASSESSMENT     MDD (major depressive disorder), recurrent severe, without psychosis ([facility] [address]   Anxiety unspecified     PLAN     Patient's symptoms show some improvement today  Medication adjustments as discussed with the attending physician: Continue current medication regimen and observe  Side effects and risks versus benefits of medications were discussed with the patient   Attempt to develop insight, psycho-education and supportive therapy conducted. Probable discharge: tomorrow  [facility]: [facility] INVASIVE SURG[facility] outpatient, daily while on inpatient unit       Electronically signed by Michael Jonas PA-C on 3/17/2023 at 11:17 AM Reviewed patient's current plan of care and vital signs with nursing staff. **This report has been created using voice recognition software. It may contain minor errors which are inherent in voice recognition technology. **                                     Psychiatry Attending Attestation     I assessed this patient and reviewed the case and plan of care with Michael Jonas PA-C. I have reviewed the above documentation and I agree with the findings and treatment plan with the following updates. Patient feels better than before. Mood and affect are better. Patient reports fleeting suicidal thoughts with no intent or plan. Patient notes that these thoughts are occurring less frequently. Denies any homicidal thoughts, that was explored with the patient. Oriented to time place and person. Recent and remote memory is intact. Patient feels hopeful. Sleep and appetite is good. No side effect from medication reported. Side-effect of medication were discussed with the patient . Patient is responding to current treatment. Discharge soon, if patient continues to show improvement. Case discussed with the staff. PLAN  Patient s symptoms   are improving  Will continue same medication today  Attempt to develop insight  Psycho-education conducted. Supportive Therapy conducted. Probable discharge is tomorrow  Follow-up TBD    More than 16 mins of the session was spent doing Supportive psychotherapy and coordinating care. Session lasted for over 30 mins. Patient was evaluated by Michael Jonas PA-C on the unit in person and I evaluated patient as Tele visit. This Virtual Visit was conducted with patient's consent. The patient is located in a state where I am licensed to provide care. Mary Jane Tello is a 33 y.o. female being evaluated by a Virtual Visit (video visit) encounter to address concerns as mentioned above. A caregiver was present in the room along with the patient. Patient is present at [address] [place] [address] and I am physically present at my home in [facility]     --Josiane Laboy MD on 3/17/2023 at 8:21 PM    An electronic signature was used to authenticate this note. **This report has been created using voice recognition software. It may contain minor errors which are inherent in voice recognition technology. **

## 2023-03-17 NOTE — BH NOTE
This RN has reviewed and agrees with Anu Shaw LPN's data collection and has collaborated with this LPN regarding the patient's care plan.

## 2023-03-18 VITALS
DIASTOLIC BLOOD PRESSURE: 59 MMHG | BODY MASS INDEX: 35.85 KG/M2 | RESPIRATION RATE: 16 BRPM | OXYGEN SATURATION: 94 % | HEIGHT: 64 IN | TEMPERATURE: 97.4 F | WEIGHT: 210 LBS | HEART RATE: 79 BPM | SYSTOLIC BLOOD PRESSURE: 129 MMHG

## 2023-03-18 PROBLEM — F03.A4 MILD DEMENTIA WITH ANXIETY (HCC): Status: ACTIVE | Noted: 2023-03-18

## 2023-03-18 PROBLEM — F41.9 ANXIETY DISORDER: Status: ACTIVE | Noted: 2023-03-18

## 2023-03-18 PROBLEM — R45.851 DEPRESSION WITH SUICIDAL IDEATION: Status: ACTIVE | Noted: 2023-03-18

## 2023-03-18 PROBLEM — F32.A DEPRESSION WITH SUICIDAL IDEATION: Status: ACTIVE | Noted: 2023-03-18

## 2023-03-18 PROBLEM — F33.2 SEVERE EPISODE OF RECURRENT MAJOR DEPRESSIVE DISORDER, WITHOUT PSYCHOTIC FEATURES (HCC): Status: ACTIVE | Noted: 2023-03-18

## 2023-03-18 PROCEDURE — 6370000000 HC RX 637 (ALT 250 FOR IP): Performed by: PHYSICIAN ASSISTANT

## 2023-03-18 PROCEDURE — 5130000000 HC BRIDGE APPOINTMENT

## 2023-03-18 PROCEDURE — 6370000000 HC RX 637 (ALT 250 FOR IP): Performed by: PSYCHIATRY & NEUROLOGY

## 2023-03-18 RX ORDER — TRAZODONE HYDROCHLORIDE 50 MG/1
50 TABLET ORAL NIGHTLY PRN
Qty: 10 TABLET | Refills: 0 | Status: SHIPPED | OUTPATIENT
Start: 2023-03-18

## 2023-03-18 RX ADMIN — PAROXETINE 30 MG: 30 TABLET, FILM COATED ORAL at 07:50

## 2023-03-18 RX ADMIN — IBUPROFEN 400 MG: 400 TABLET, FILM COATED ORAL at 07:50

## 2023-03-18 RX ADMIN — ALUMINUM HYDROXIDE, MAGNESIUM HYDROXIDE, AND SIMETHICONE 30 ML: 200; 200; 20 SUSPENSION ORAL at 00:40

## 2023-03-18 RX ADMIN — CETIRIZINE HYDROCHLORIDE 10 MG: 10 TABLET, FILM COATED ORAL at 07:50

## 2023-03-18 ASSESSMENT — PAIN DESCRIPTION - DESCRIPTORS
DESCRIPTORS: ACHING
DESCRIPTORS: DISCOMFORT

## 2023-03-18 ASSESSMENT — PAIN SCALES - GENERAL
PAINLEVEL_OUTOF10: 4
PAINLEVEL_OUTOF10: 2
PAINLEVEL_OUTOF10: 6
PAINLEVEL_OUTOF10: 7

## 2023-03-18 ASSESSMENT — PAIN DESCRIPTION - ORIENTATION: ORIENTATION: MID

## 2023-03-18 ASSESSMENT — PAIN DESCRIPTION - LOCATION
LOCATION: ABDOMEN
LOCATION: THROAT

## 2023-03-18 ASSESSMENT — PAIN - FUNCTIONAL ASSESSMENT: PAIN_FUNCTIONAL_ASSESSMENT: ACTIVITIES ARE NOT PREVENTED

## 2023-03-18 NOTE — DISCHARGE INSTRUCTIONS
Keep all follow-up appointments and take medications as directed. Call the hope line if needed at :  Mark [place], and [facility] [phone]. Daisy Abdul [phone]. [facility] [phone]. [address] [employer] [phone]. Fazal Prater and [place] [phone]    Symptoms to report to your Doctor:  Depression  Inability to eat, sleep, or have a bowel movement  Increased sleepiness and lethargy  Voices in your head  Any thoughts of harming self or others    Things to avoid:  Caffeine  Alcohol  No street drugs  Over the counter medications unless Ok'd by your physician or pharmacist.  Driving or operating machinery until full effects of your medications are known. Driving or operating machinery if dizzy or drowsy from medications. Use journal as directed. Education:  Illness and medication teaching was completed. Discharge Disposition: Patient was discharged to home and was transported by private vehicle. Patient was accompanied by family. Information sent to next level of care:      __x__Discharge instructions    [facility] Hotline:  [phone]    Crisis phone numbers:  Mark Hawkins, and [facility] [phone]. Neelam Corrales, and [facility] [address] [facility] [phone]. [employer] [phone]. [address] Linnette Diehl and [place] [phone]. [facility]   [address] [facility], [address]  [address]  [address] [facility]  [facility] [address]  [address]  [facility] [place], [address] [facility] [address], [address]    [facility] S[address]DHARTH[address] [facility]  [address] [facility], [address] [facility]  [address] [address] [address]  [address] [facility] [address], [address]  [address] [facility] [address]  [place], [address] [place] [address]  [facility] [address]  [address], [address] [facility]  [address], [address]  [phone]    [facility] PSYCHIATRIC [place]  [address], [address]  [address]  [address] [place]   T[address]  [address] [facility]  [facility]  [place], [address]  Stephany [address]  [address], [address] [place] [address] Road  [phone]

## 2023-03-18 NOTE — PLAN OF CARE
Problem: Pain  Goal: Verbalizes/displays adequate comfort level or baseline comfort level  3/17/2023 2001 by Lieutenant Julita LPN  Outcome: Not Progressing  Flowsheets (Taken 3/16/2023 2327 by Marisabel Barbosa LPN)  Verbalizes/displays adequate comfort level or baseline comfort level:   Encourage patient to monitor pain and request assistance   Assess pain using appropriate pain scale   Administer analgesics based on type and severity of pain and evaluate response  Note: Patient reports 7/10 throat pain. PRN Motrin given and patient satisfied  3/17/2023 1401 by Yefri Bains RN  Outcome: Progressing  Note: Reports having a sore throat and rating the pain 7 out of 10 with 10 being the worse pain. Problem: Discharge Planning  Goal: Discharge to home or other facility with appropriate resources  3/17/2023 2001 by Lieutenant Julita LPN  Outcome: Progressing  Flowsheets (Taken 3/17/2023 1330 by Yefri Bains, RN)  Discharge to home or other facility with appropriate resources: Identify barriers to discharge with patient and caregiver  Note: Patient plans to discharge home with  and kids on 3/18/2023  3/17/2023 1401 by Yefri Bains RN  Outcome: Progressing  Flowsheets (Taken 3/17/2023 1330)  Discharge to home or other facility with appropriate resources: Identify barriers to discharge with patient and caregiver  Note: Discharge planning in process. Problem: Depression  Goal: Will be euthymic at discharge  Description: INTERVENTIONS:  1. Administer medication as ordered  2. Provide emotional support via 1:1 interaction with staff  3. Encourage involvement in milieu/groups/activities  4. Monitor for social isolation  3/17/2023 2001 by Lieutenant Julita LPN  Outcome: Progressing  Note: Patient denies depression. Mood 10/10 with low anxiety and low depression  3/17/2023 1401 by Yefri Bains RN  Outcome: Progressing  Note: Rates her mood 10 out of 10 with 10 being the best mood.  Denies anxiety and depression. Problem: Anxiety  Goal: Will report anxiety at manageable levels  Description: INTERVENTIONS:  1. Administer medication as ordered  2. Teach and rehearse alternative coping skills  3. Provide emotional support with 1:1 interaction with staff  3/17/2023 2001 by Jacki Sánchez LPN  Outcome: Progressing  Flowsheets (Taken 3/17/2023 1956)  Will report anxiety at manageable levels: Provide emotional support with 1:1 interaction with staff  Note: Mood 10/10 with low anxiety and low depression   3/17/2023 1401 by Monica Chau RN  Outcome: Progressing  Flowsheets (Taken 3/17/2023 1330)  Will report anxiety at manageable levels: Provide emotional support with 1:1 interaction with staff  Note: Denies anxiety. Problem: Sleep Disturbance  Goal: Will exhibit normal sleeping pattern  Description: INTERVENTIONS:  1. Administer medication as ordered  2. Decrease environmental stimuli, including noise, as appropriate  3. Discourage social isolation and naps during the day  3/17/2023 2001 by Jacki Sánchez LPN  Outcome: Progressing  Note: No sleep disturbances noted during this shift   3/17/2023 1401 by Monica Chau RN  Outcome: Progressing  Note: Patient slept 8 hours continuously last night. Problem: Coping  Goal: Pt/Family able to verbalize concerns and demonstrate effective coping strategies  Description: INTERVENTIONS:  1. Assist patient/family to identify coping skills, available support systems and cultural and spiritual values  2. Provide emotional support, including active listening and acknowledgement of concerns of patient and caregivers  3. Reduce environmental stimuli, as able  4. Instruct patient/family in relaxation techniques, as appropriate  5.  Assess for spiritual pain/suffering and initiate Spiritual Care, Psychosocial Clinical Specialist consults as needed  3/17/2023 2001 by Jacki Sánchez LPN  Outcome: Progressing  Flowsheets (Taken 3/17/2023 1330 by Shawnee Gary HARSHA Stoll RN)  Patient/family able to verbalize anxieties, fears, and concerns, and demonstrate effective coping: Provide emotional support, including active listening and acknowledgement of concerns of patient and caregivers  Note: Patient coped during this shift by watching TV, journaling, doing crossword puzzles and coloring. 3/17/2023 1401 by Clover Alvarez RN  Outcome: Progressing  Flowsheets (Taken 3/17/2023 1330)  Patient/family able to verbalize anxieties, fears, and concerns, and demonstrate effective coping: Provide emotional support, including active listening and acknowledgement of concerns of patient and caregivers  Note: Does not verbalize any coping skills at this time. Care plan reviewed with patient.   Patient does verbalize understanding of the plan of care and does contribute to goal setting
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
## 2023-03-18 NOTE — BH NOTE
Patient did not attend group but set a goal of trying to use what she has learned on the unit and apply it when she gets discharged to become better.

## 2023-03-18 NOTE — PROGRESS NOTES
Behavioral Health   Discharge Note    Pt discharged with followings belongings:   Dental Appliances: None  Vision - Corrective Lenses: None  Hearing Aid: None  Jewelry: Body Piercing  Body Piercings Removed: No  Clothing: Pants, Socks, Shirt, Footwear, Undergarments, Other (Comment) (tank top)  Other Valuables: Other (Comment) (4 books, pack of crayons, and lowZoji ID card)   Valuables retrieved from safe, security envelope number:  n/a and returned to patient. Patient left department with staff via ambulatory. Discharged to home. \"An Important Message from Medicare About Your Rights\" (IMM) form photocopy original from admission and provided to pt at least 4 hours prior to discharge yes. If pt left within 4 hours of receiving 2nd delivery of IMM, this is because pt was agreeable with hospital discharge. Patient/guardian education on aftercare instructions: Yes  Bridge appointment completed:  yes. Reviewed Discharge Instructions with patient/family/nursing facility. Patient/family verbalizes understanding and agreement with the discharge plan using the teachback method. Patient/family verbalize understanding of AVS:Yes    Status EXAM upon discharge:  Mental Status and Behavioral Exam  Normal: Yes  Level of Assistance: Independent/Self  Facial Expression: Brightened  Affect: Appropriate  Level of Consciousness: Alert  Frequency of Checks: 4 times per hour, close  Mood:Normal: Yes (describes mood as 9/10)  Mood: Depressed (denies anxiety and depression however appears depressed)  Motor Activity:Normal: Yes  Motor Activity: Decreased  Eye Contact: Good  Observed Behavior: Cooperative, Friendly  Sexual Misconduct History: Current - no  Preception: [place] to person, [place] to time, [place] to place, [place] to situation  Attention:Normal: Yes  Thought Processes: Unremarkable  Thought Content:Normal: Yes  Depression Symptoms: No problems reported or observed. Anxiety Symptoms: No problems reported or observed.   Susan Symptoms: No problems reported or observed.   Hallucinations: None  Delusions: No  Delusions: Paranoid  Memory:Normal: Yes  Insight and Judgment: No  Insight and Judgment: Poor judgment, Poor insight    Vicki Muñiz RN

## 2023-03-18 NOTE — PLAN OF CARE
Problem: Discharge Planning  Goal: Discharge to home or other facility with appropriate resources  3/18/2023 0849 by Huan Prince RN  Outcome: Adequate for Discharge  3/17/2023 2001 by Deep Hackett LPN  Outcome: Progressing  Flowsheets (Taken 3/17/2023 1330 by López Roberto RN)  Discharge to home or other facility with appropriate resources: Identify barriers to discharge with patient and caregiver  Note: Patient plans to discharge home with  and kids on 3/18/2023     Problem: Depression  Goal: Will be euthymic at discharge  Description: INTERVENTIONS:  1. Administer medication as ordered  2. Provide emotional support via 1:1 interaction with staff  3. Encourage involvement in milieu/groups/activities  4. Monitor for social isolation  3/18/2023 0849 by Huan Prince RN  Outcome: Adequate for Discharge  3/17/2023 2001 by Deep Hackett LPN  Outcome: Progressing  Note: Patient denies depression. Mood 10/10 with low anxiety and low depression     Problem: Anxiety  Goal: Will report anxiety at manageable levels  Description: INTERVENTIONS:  1. Administer medication as ordered  2. Teach and rehearse alternative coping skills  3. Provide emotional support with 1:1 interaction with staff  3/18/2023 0849 by Huan Prince RN  Outcome: Adequate for Discharge  3/17/2023 2001 by Deep Hackett LPN  Outcome: Progressing  Flowsheets (Taken 3/17/2023 1956)  Will report anxiety at manageable levels: Provide emotional support with 1:1 interaction with staff  Note: Mood 10/10 with low anxiety and low depression      Problem: Sleep Disturbance  Goal: Will exhibit normal sleeping pattern  Description: INTERVENTIONS:  1. Administer medication as ordered  2. Decrease environmental stimuli, including noise, as appropriate  3.  Discourage social isolation and naps during the day  3/18/2023 0849 by Huan Prince RN  Outcome: Adequate for Discharge  3/17/2023 2001 by Deep Hackett LPN  Outcome: Progressing  Note: No sleep disturbances noted during this shift      Problem: Coping  Goal: Pt/Family able to verbalize concerns and demonstrate effective coping strategies  Description: INTERVENTIONS:  1. Assist patient/family to identify coping skills, available support systems and cultural and spiritual values  2. Provide emotional support, including active listening and acknowledgement of concerns of patient and caregivers  3. Reduce environmental stimuli, as able  4. Instruct patient/family in relaxation techniques, as appropriate  5. Assess for spiritual pain/suffering and initiate Spiritual Care, Psychosocial Clinical Specialist consults as needed  3/18/2023 0849 by Lakshmi Cohen RN  Outcome: Adequate for Discharge  3/17/2023 2001 by Dara Rossi LPN  Outcome: Progressing  Flowsheets (Taken 3/17/2023 1330 by Dante Negron RN)  Patient/family able to verbalize anxieties, fears, and concerns, and demonstrate effective coping: Provide emotional support, including active listening and acknowledgement of concerns of patient and caregivers  Note: Patient coped during this shift by watching TV, journaling, doing crossword puzzles and coloring. Problem: Pain  Goal: Verbalizes/displays adequate comfort level or baseline comfort level  3/18/2023 0849 by Lakshmi Cohen RN  Outcome: Adequate for Discharge  3/17/2023 2001 by Dara Rossi LPN  Outcome: Not Progressing  Flowsheets (Taken 3/16/2023 2327 by Allison Orantes LPN)  Verbalizes/displays adequate comfort level or baseline comfort level:   Encourage patient to monitor pain and request assistance   Assess pain using appropriate pain scale   Administer analgesics based on type and severity of pain and evaluate response  Note: Patient reports 7/10 throat pain.  PRN Motrin given and patient satisfied     Problem: Pain  Goal: Verbalizes/displays adequate comfort level or baseline comfort level  3/18/2023 0849 by Lakshmi Cohen RN  Outcome: Adequate for Discharge  3/17/2023 2001 by Talia Delgado LPN  Outcome: Not Progressing  Flowsheets (Taken 3/16/2023 2327 by Akira Ramirez LPN)  Verbalizes/displays adequate comfort level or baseline comfort level:   Encourage patient to monitor pain and request assistance   Assess pain using appropriate pain scale   Administer analgesics based on type and severity of pain and evaluate response  Note: Patient reports 7/10 throat pain. PRN Motrin given and patient satisfied   Care plan reviewed with patient. Patient verbalize understanding of the plan of care and contribute to goal setting.

## 2023-03-19 NOTE — PROGRESS NOTES
STEPHEN has reviewed and agrees with ARYAN Weldon LPN's shift   Assessment.     Mansoor Brown RN  3/18/2023

## 2023-03-20 ENCOUNTER — TELEPHONE (OUTPATIENT)
Dept: PSYCHIATRY | Age: 28
End: 2023-03-20

## 2023-03-20 NOTE — TELEPHONE ENCOUNTER
[address] Discharge Call Back Program. Called patient. Patient reported that there were no issues with their discharge.

## 2023-05-02 ENCOUNTER — APPOINTMENT (OUTPATIENT)
Dept: GENERAL RADIOLOGY | Age: 28
End: 2023-05-02
Payer: MEDICAID

## 2023-05-02 ENCOUNTER — HOSPITAL ENCOUNTER (EMERGENCY)
Age: 28
Discharge: HOME | End: 2023-05-02
Payer: MEDICAID

## 2023-05-02 VITALS
SYSTOLIC BLOOD PRESSURE: 148 MMHG | DIASTOLIC BLOOD PRESSURE: 92 MMHG | WEIGHT: 209 LBS | HEART RATE: 95 BPM | RESPIRATION RATE: 18 BRPM | OXYGEN SATURATION: 98 % | HEIGHT: 64 IN | BODY MASS INDEX: 35.68 KG/M2 | TEMPERATURE: 98.7 F

## 2023-05-02 DIAGNOSIS — S63.501A SPRAIN OF RIGHT WRIST, INITIAL ENCOUNTER: ICD-10-CM

## 2023-05-02 DIAGNOSIS — S63.632A SPRAIN OF INTERPHALANGEAL JOINT OF RIGHT MIDDLE FINGER, INITIAL ENCOUNTER: Primary | ICD-10-CM

## 2023-05-02 PROCEDURE — 6370000000 HC RX 637 (ALT 250 FOR IP): Performed by: NURSE PRACTITIONER

## 2023-05-02 PROCEDURE — 73130 X-RAY EXAM OF HAND: CPT

## 2023-05-02 PROCEDURE — 99283 EMERGENCY DEPT VISIT LOW MDM: CPT

## 2023-05-02 RX ORDER — ONDANSETRON 4 MG/1
4 TABLET, ORALLY DISINTEGRATING ORAL ONCE
Status: COMPLETED | OUTPATIENT
Start: 2023-05-02 | End: 2023-05-02

## 2023-05-02 RX ORDER — NAPROXEN 500 MG/1
500 TABLET ORAL 2 TIMES DAILY WITH MEALS
Qty: 30 TABLET | Refills: 0 | Status: SHIPPED | OUTPATIENT
Start: 2023-05-02 | End: 2023-05-17

## 2023-05-02 RX ORDER — ONDANSETRON 4 MG/1
4 TABLET, ORALLY DISINTEGRATING ORAL 3 TIMES DAILY PRN
Qty: 21 TABLET | Refills: 0 | Status: SHIPPED | OUTPATIENT
Start: 2023-05-02

## 2023-05-02 RX ORDER — NAPROXEN 250 MG/1
500 TABLET ORAL ONCE
Status: COMPLETED | OUTPATIENT
Start: 2023-05-02 | End: 2023-05-02

## 2023-05-02 RX ADMIN — NAPROXEN 500 MG: 250 TABLET ORAL at 16:51

## 2023-05-02 RX ADMIN — ONDANSETRON 4 MG: 4 TABLET, ORALLY DISINTEGRATING ORAL at 16:51

## 2023-05-02 ASSESSMENT — PAIN - FUNCTIONAL ASSESSMENT: PAIN_FUNCTIONAL_ASSESSMENT: 0-10

## 2023-05-02 ASSESSMENT — PAIN SCALES - GENERAL: PAINLEVEL_OUTOF10: 10

## 2023-05-02 ASSESSMENT — PAIN DESCRIPTION - LOCATION: LOCATION: HAND

## 2023-05-02 ASSESSMENT — PAIN DESCRIPTION - ORIENTATION: ORIENTATION: RIGHT

## 2023-05-02 NOTE — ED PROVIDER NOTES
[facility] Emergency Department    CHIEF COMPLAINT       Chief Complaint   Patient presents with    Hand Pain     Right       Nurses Notes reviewed and I agree except as noted in the HPI. HISTORY OF PRESENT ILLNESS    Thomas Urias is a 33 y.o. female who presents to the ED for evaluation of right hand pain. Pain started last night while in bed and patient managed to catch hand between bed and outside corner of a wall and happened to twist the hand while it was jammed. The pain is the worst in the right middle finger, index and ring fingers and is described as a sharp pain. She is able to open and close hand with moderate pain; she has had weakness to the right hand all day today. Pain is constant and worse with palpation and movement. She has tried Ibuprofen this morning and Tylenol in the afternoon with mild relief. Patient notes nausea and vomiting associated with hangover from last night. HPI was provided by the patient. PAST MEDICAL HISTORY     Past Medical History:   Diagnosis Date    History of tubal ligation        SURGICALHISTORY      has a past surgical history that includes Tubal ligation. CURRENT MEDICATIONS       Previous Medications    CETIRIZINE (ZYRTEC) 10 MG TABLET    Take 10 mg by mouth daily    HYDROXYZINE HCL (ATARAX) 10 MG TABLET    Take 10 mg by mouth 3 times daily as needed for Itching    PAROXETINE (PAXIL) 30 MG TABLET    Take 30 mg by mouth every morning    TRAZODONE (DESYREL) 50 MG TABLET    Take 1 tablet by mouth nightly as needed for Sleep       ALLERGIES     is allergic to penicillins. FAMILY HISTORY     has no family status information on file. family history is not on file.     SOCIAL HISTORY       Social History     Socioeconomic History    Marital status:      Spouse name: Not on file    Number of children: Not on file    Years of education: Not on file    Highest education level: Not on file   Occupational History    Not on file   Tobacco Use
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
## 2023-05-10 ENCOUNTER — HOSPITAL ENCOUNTER (EMERGENCY)
Age: 28
Discharge: HOME | End: 2023-05-10
Payer: MEDICAID

## 2023-05-10 VITALS
TEMPERATURE: 98.9 F | SYSTOLIC BLOOD PRESSURE: 141 MMHG | WEIGHT: 200 LBS | RESPIRATION RATE: 20 BRPM | HEART RATE: 72 BPM | OXYGEN SATURATION: 98 % | DIASTOLIC BLOOD PRESSURE: 87 MMHG | BODY MASS INDEX: 34.33 KG/M2

## 2023-05-10 DIAGNOSIS — K08.89 PAIN, DENTAL: ICD-10-CM

## 2023-05-10 DIAGNOSIS — K08.409 S/P TOOTH EXTRACTION: Primary | ICD-10-CM

## 2023-05-10 PROCEDURE — 64400 NJX AA&/STRD TRIGEMINAL NRV: CPT

## 2023-05-10 PROCEDURE — 2500000003 HC RX 250 WO HCPCS: Performed by: PHYSICIAN ASSISTANT

## 2023-05-10 PROCEDURE — 96372 THER/PROPH/DIAG INJ SC/IM: CPT

## 2023-05-10 PROCEDURE — 6360000002 HC RX W HCPCS: Performed by: PHYSICIAN ASSISTANT

## 2023-05-10 PROCEDURE — 99284 EMERGENCY DEPT VISIT MOD MDM: CPT

## 2023-05-10 RX ORDER — TRANEXAMIC ACID 100 MG/ML
1000 INJECTION, SOLUTION INTRAVENOUS ONCE
Status: COMPLETED | OUTPATIENT
Start: 2023-05-10 | End: 2023-05-10

## 2023-05-10 RX ORDER — LIDOCAINE HYDROCHLORIDE 10 MG/ML
5 INJECTION, SOLUTION EPIDURAL; INFILTRATION; INTRACAUDAL; PERINEURAL ONCE
Status: COMPLETED | OUTPATIENT
Start: 2023-05-10 | End: 2023-05-10

## 2023-05-10 RX ORDER — KETOROLAC TROMETHAMINE 30 MG/ML
30 INJECTION, SOLUTION INTRAMUSCULAR; INTRAVENOUS ONCE
Status: COMPLETED | OUTPATIENT
Start: 2023-05-10 | End: 2023-05-10

## 2023-05-10 RX ADMIN — LIDOCAINE HYDROCHLORIDE 5 ML: 10 INJECTION, SOLUTION EPIDURAL; INFILTRATION; INTRACAUDAL; PERINEURAL at 21:02

## 2023-05-10 RX ADMIN — KETOROLAC TROMETHAMINE 30 MG: 30 INJECTION, SOLUTION INTRAMUSCULAR; INTRAVENOUS at 20:09

## 2023-05-10 RX ADMIN — TRANEXAMIC ACID 1000 MG: 100 INJECTION, SOLUTION INTRAVENOUS at 21:01

## 2023-05-10 ASSESSMENT — PAIN - FUNCTIONAL ASSESSMENT: PAIN_FUNCTIONAL_ASSESSMENT: 0-10

## 2023-05-10 ASSESSMENT — PAIN DESCRIPTION - LOCATION: LOCATION: TEETH;MOUTH

## 2023-05-10 ASSESSMENT — PAIN SCALES - GENERAL
PAINLEVEL_OUTOF10: 10
PAINLEVEL_OUTOF10: 5

## 2023-05-10 NOTE — ED TRIAGE NOTES
Pt comes to ED with dental pain and bleeding from getting her top tooth pulled today at [address] [place] [address]. Pt states it happened at 930am. Pt states that it has been bleeding since and she is having severe pain. Pt states that she cannot bite down on wet gauze because it is so painful. Pt states she is on amoxicillin for this as well.

## 2023-05-11 NOTE — ED PROVIDER NOTES
[facility] [address] [address] EMERGENCY DEPT      EMERGENCY MEDICINE     Pt Name: Karly Corrales  MRN: [id number]  [place] 1995  Date of evaluation: 5/10/2023  Provider: VALARIE Huynh    CHIEF COMPLAINT       Chief Complaint   Patient presents with    Dental Problem     HISTORY OF PRESENT ILLNESS   Karly Corrales is a pleasant 33 y.o. female who presents to the emergency department from dental pain. Patient recently had a tooth extraction which occurred this morning. Patient has been aching pain constant nothing makes worse better moderate severity. Patient states has also been bleeding. No tongue swelling. No shortness of breath. No headache. No other complaints. Not on blood thinners. Treated ibuprofen without relief. PASTMEDICAL HISTORY     Past Medical History:   Diagnosis Date    History of tubal ligation        Patient Active Problem List   Diagnosis Code    MDD (major depressive disorder), recurrent severe, without psychosis ([facility] F33.2    Mild dementia with anxiety ([facility] F03. A4    Severe episode of recurrent major depressive disorder, without psychotic features ([facility] F33.2    Anxiety disorder F41.9    Depression with suicidal ideation F32. A, R45.851     SURGICAL HISTORY       Past Surgical History:   Procedure Laterality Date    TUBAL LIGATION         CURRENT MEDICATIONS       Previous Medications    CETIRIZINE (ZYRTEC) 10 MG TABLET    Take 10 mg by mouth daily    HYDROXYZINE HCL (ATARAX) 10 MG TABLET    Take 10 mg by mouth 3 times daily as needed for Itching    NAPROXEN (NAPROSYN) 500 MG TABLET    Take 1 tablet by mouth 2 times daily (with meals) for 30 doses    ONDANSETRON (ZOFRAN-ODT) 4 MG DISINTEGRATING TABLET    Take 1 tablet by mouth 3 times daily as needed for Nausea or Vomiting    PAROXETINE (PAXIL) 30 MG TABLET    Take 30 mg by mouth every morning    TRAZODONE (DESYREL) 50 MG TABLET    Take 1 tablet by mouth nightly as needed for Sleep       ALLERGIES     is allergic to

## 2023-05-29 ENCOUNTER — HOSPITAL ENCOUNTER (EMERGENCY)
Age: 28
Discharge: HOME | End: 2023-05-29
Payer: COMMERCIAL

## 2023-05-29 VITALS
RESPIRATION RATE: 18 BRPM | DIASTOLIC BLOOD PRESSURE: 78 MMHG | HEART RATE: 78 BPM | OXYGEN SATURATION: 98 % | SYSTOLIC BLOOD PRESSURE: 132 MMHG | WEIGHT: 200 LBS | TEMPERATURE: 97.8 F | BODY MASS INDEX: 34.33 KG/M2

## 2023-05-29 DIAGNOSIS — T74.21XA SEXUAL ASSAULT OF ADULT, INITIAL ENCOUNTER: Primary | ICD-10-CM

## 2023-05-29 PROCEDURE — 96372 THER/PROPH/DIAG INJ SC/IM: CPT

## 2023-05-29 PROCEDURE — 2500000003 HC RX 250 WO HCPCS: Performed by: NURSE PRACTITIONER

## 2023-05-29 PROCEDURE — 6370000000 HC RX 637 (ALT 250 FOR IP): Performed by: NURSE PRACTITIONER

## 2023-05-29 PROCEDURE — 99284 EMERGENCY DEPT VISIT MOD MDM: CPT

## 2023-05-29 PROCEDURE — 6360000002 HC RX W HCPCS: Performed by: NURSE PRACTITIONER

## 2023-05-29 PROCEDURE — 2720000011 HC SANE KIT SUPPLY STERILE

## 2023-05-29 RX ORDER — DOXYCYCLINE HYCLATE 100 MG
100 TABLET ORAL 2 TIMES DAILY
Qty: 14 TABLET | Refills: 0 | Status: SHIPPED | OUTPATIENT
Start: 2023-05-29 | End: 2023-06-05

## 2023-05-29 RX ORDER — METRONIDAZOLE 500 MG/1
2000 TABLET ORAL ONCE
Status: COMPLETED | OUTPATIENT
Start: 2023-05-29 | End: 2023-05-29

## 2023-05-29 RX ORDER — ACETAMINOPHEN 325 MG/1
650 TABLET ORAL ONCE
Status: COMPLETED | OUTPATIENT
Start: 2023-05-29 | End: 2023-05-29

## 2023-05-29 RX ORDER — DOXYCYCLINE HYCLATE 100 MG
100 TABLET ORAL 2 TIMES DAILY
Qty: 14 TABLET | Refills: 0 | Status: SHIPPED | OUTPATIENT
Start: 2023-05-29 | End: 2023-05-29 | Stop reason: SDUPTHER

## 2023-05-29 RX ADMIN — ACETAMINOPHEN 650 MG: 325 TABLET ORAL at 05:11

## 2023-05-29 RX ADMIN — LIDOCAINE HYDROCHLORIDE 1000 MG: 10 INJECTION, SOLUTION EPIDURAL; INFILTRATION; INTRACAUDAL; PERINEURAL at 05:19

## 2023-05-29 RX ADMIN — METRONIDAZOLE 2000 MG: 500 TABLET ORAL at 05:19

## 2023-05-29 ASSESSMENT — PAIN DESCRIPTION - LOCATION
LOCATION: GENERALIZED
LOCATION: HEAD

## 2023-05-29 ASSESSMENT — PAIN SCALES - GENERAL
PAINLEVEL_OUTOF10: 5
PAINLEVEL_OUTOF10: 6

## 2023-05-29 ASSESSMENT — PAIN - FUNCTIONAL ASSESSMENT: PAIN_FUNCTIONAL_ASSESSMENT: 0-10
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
## 2023-05-29 NOTE — ED NOTES
Pt ambulated to the bathroom without difficulty. Pt denies any needs. Call light within reach.       Adelaida Montes De Oca RN  05/29/23 8603

## 2023-05-29 NOTE — ED NOTES
Upon initial contact with the patient, pt resting in bed, tearful. Sister and brother at bedside.       Fatmata Mims RN  05/29/23 8311

## 2023-05-29 NOTE — ED NOTES
Patient advocate at bedside, kit and exam explained to patient, consent signed.      Thurmond Pallas, RN  05/29/23 7423
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
## 2023-05-29 NOTE — ED NOTES
Patient medicated per [facility], patient states verbal understanding of follow up instructions at this time.       Heena Hawthorne RN  05/29/23 9014

## 2023-05-29 NOTE — ED NOTES
Discharge instructions and follow up discussed with pt. Pt verbalized understanding and denied further questions. LDA removed. Pt discharged with all belongings. Patient states verbal understanding of the follow up process and states no questions at this time.       Tito Fraga RN  05/29/23 2202

## 2024-01-27 ENCOUNTER — HOSPITAL ENCOUNTER (EMERGENCY)
Age: 29
Discharge: HOME | End: 2024-01-27
Payer: MEDICAID

## 2024-01-27 VITALS
RESPIRATION RATE: 18 BRPM | DIASTOLIC BLOOD PRESSURE: 76 MMHG | OXYGEN SATURATION: 97 % | HEART RATE: 83 BPM | SYSTOLIC BLOOD PRESSURE: 132 MMHG | TEMPERATURE: 98.6 F

## 2024-01-27 DIAGNOSIS — S63.501A SPRAIN OF RIGHT WRIST, INITIAL ENCOUNTER: Primary | ICD-10-CM

## 2024-01-27 PROCEDURE — 29125 APPL SHORT ARM SPLINT STATIC: CPT

## 2024-01-27 PROCEDURE — 99283 EMERGENCY DEPT VISIT LOW MDM: CPT

## 2024-01-27 PROCEDURE — 6370000000 HC RX 637 (ALT 250 FOR IP): Performed by: NURSE PRACTITIONER

## 2024-01-27 RX ORDER — NAPROXEN 500 MG/1
500 TABLET ORAL 2 TIMES DAILY WITH MEALS
Qty: 30 TABLET | Refills: 0 | Status: SHIPPED | OUTPATIENT
Start: 2024-01-27 | End: 2024-02-11

## 2024-01-27 RX ORDER — NAPROXEN 250 MG/1
500 TABLET ORAL ONCE
Status: COMPLETED | OUTPATIENT
Start: 2024-01-27 | End: 2024-01-27

## 2024-01-27 RX ADMIN — NAPROXEN 500 MG: 250 TABLET ORAL at 12:56

## 2024-01-27 NOTE — ED NOTES
Patient to ED for right hand pain. Patient reports she has carpel tunnel and arthritis in that hand. Patient denies injury

## 2024-01-27 NOTE — ED PROVIDER NOTES
[facility] Emergency Department    CHIEF COMPLAINT       Chief Complaint   Patient presents with    Hand Pain     right       Nurses Notes reviewed and I agree except as noted in the HPI.    HISTORY OF PRESENT ILLNESS   Carissa Tovar is a 28 y.o. female who presents to the ED for evaluation of right wrist pain.  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  She denies any numbness or weakness, she notes pain is worse with radial deviation.  She notes chronic pain in her wrists and shoulders.  She notes a history of arthritis.  She has taken Tylenol and Motrin with no improvement.  She notes history of PTSD and depression.        HPI was provided by the patient.         PAST MEDICAL HISTORY     Past Medical History:   Diagnosis Date    History of tubal ligation        SURGICALHISTORY      has a past surgical history that includes Tubal ligation.    CURRENT MEDICATIONS       Discharge Medication List as of 1/27/2024 12:54 PM        CONTINUE these medications which have NOT CHANGED    Details   ondansetron (ZOFRAN-ODT) 4 MG disintegrating tablet Take 1 tablet by mouth 3 times daily as needed for Nausea or Vomiting, Disp-21 tablet, R-0Normal      traZODone (DESYREL) 50 MG tablet Take 1 tablet by mouth nightly as needed for Sleep, Disp-10 tablet, R-0Normal      PARoxetine (PAXIL) 30 MG tablet Take 30 mg by mouth every morningHistorical Med      hydrOXYzine HCl (ATARAX) 10 MG tablet Take 10 mg by mouth 3 times daily as needed for ItchingHistorical Med      cetirizine (ZYRTEC) 10 MG tablet Take 10 mg by mouth dailyHistorical Med             ALLERGIES     is allergic to penicillins.    FAMILY HISTORY     has no family status information on file.    family history is not on file.    SOCIAL HISTORY       Social History     Socioeconomic History    Marital status:      Spouse name: Not on file    Number of  [place] [address] [phone]  Go in 1 week  For follow up and evaluation, If symptoms worsen      DISCHARGE MEDICATIONS:  Discharge Medication List as of 1/27/2024 12:54 PM          (Please note that portions of this note were completed with a voice recognition program.  Efforts were made to edit the dictations but occasionally words are mis-transcribed.)      Provider:  I personally performed the services described in the documentation,reviewed and edited the documentation which was dictated to the scribe in my presence, and it accurately records my words and actions.    Sulaiman Bell CNP 01/27/24 6:58 PM    MARY Petersen CNP, [place], APRN - CNP  01/27/24 8628

## 2024-01-27 NOTE — DISCHARGE INSTR - COC
Continuity of Care Form    Patient Name: Carissa Tovar   :  1995  MRN:  [id number]    Admit date:  2024  Discharge date:  ***    Code Status Order: Prior   Advance Directives:     Admitting Physician:  No admitting provider for patient encounter.  PCP: Suni Torres, MARY - CNP    Discharging Nurse: ***  Discharging Hospital Unit/Room#: D/D  Discharging Unit Phone Number: ***    Emergency Contact:   Extended Emergency Contact Information  Primary Emergency Contact: Eufemia Gill  Home Phone: [phone]  Relation: Parent  Secondary Emergency Contact: Kristin Gill  Home Phone: [phone]  Relation: Brother/Sister  Preferred language: English   needed? No    Past Surgical History:  Past Surgical History:   Procedure Laterality Date    TUBAL LIGATION         Immunization History:     There is no immunization history on file for this patient.    Active Problems:  Patient Active Problem List   Diagnosis Code    MDD (major depressive disorder), recurrent severe, without psychosis ([facility]) F33.2    Mild dementia with anxiety ([facility]) F03.A4    Severe episode of recurrent major depressive disorder, without psychotic features ([facility]) F33.2    Anxiety disorder F41.9    Depression with suicidal ideation F32.A, R45.851       Isolation/Infection:   Isolation            No Isolation          Patient Infection Status       None to display                     Nurse Assessment:  Last Vital Signs: /76   Pulse 83   Temp 98.6 °F (37 °C) (Oral)   Resp 18   SpO2 97%     Last documented pain score (0-10 scale):    Last Weight:   Wt Readings from Last 1 Encounters:   23 89.8 kg (198 lb)     Mental Status:  {IP PT MENTAL STATUS:31471}    IV Access:  { MAHAD IV ACCESS:529411183}    Nursing Mobility/ADLs:  Walking   {CHP DME ADLs:519492389}  Transfer  {CHP DME ADLs:396450051}  Bathing  {CHP DME ADLs:942998113}  Dressing  {CHP DME ADLs:386270918}  Toileting  {CHP DME ADLs:362984979}  Feeding  {CHP  {Prognosis:4501438345}    Condition at Discharge: { Patient Condition:384597491}    Rehab Potential (if transferring to Rehab): {Prognosis:4317399626}    Recommended Labs or Other Treatments After Discharge: ***    Physician Certification: I certify the above information and transfer of Carissa Tovar  is necessary for the continuing treatment of the diagnosis listed and that she requires {Admit to Appropriate Level of Care:19733} for {GREATER/LESS:159139173} 30 days.     Update Admission H&P: {CHP DME Changes in HandP:096854244}    PHYSICIAN SIGNATURE:  {Esignature:949545959}

## 2024-02-28 ENCOUNTER — HOSPITAL ENCOUNTER (EMERGENCY)
Age: 29
Discharge: HOME | End: 2024-02-28
Payer: MEDICAID

## 2024-02-28 VITALS
DIASTOLIC BLOOD PRESSURE: 70 MMHG | RESPIRATION RATE: 16 BRPM | HEART RATE: 73 BPM | TEMPERATURE: 98.4 F | WEIGHT: 188 LBS | OXYGEN SATURATION: 97 % | BODY MASS INDEX: 32.27 KG/M2 | SYSTOLIC BLOOD PRESSURE: 114 MMHG

## 2024-02-28 DIAGNOSIS — J06.9 VIRAL URI WITH COUGH: Primary | ICD-10-CM

## 2024-02-28 LAB
FLUAV RNA RESP QL NAA+PROBE: NOT DETECTED
FLUBV RNA RESP QL NAA+PROBE: NOT DETECTED
S PYO AG THROAT QL: NEGATIVE
S PYO THROAT QL CULT: NORMAL
SARS-COV-2 RNA RESP QL NAA+PROBE: NOT DETECTED

## 2024-02-28 PROCEDURE — 87077 CULTURE AEROBIC IDENTIFY: CPT

## 2024-02-28 PROCEDURE — 6370000000 HC RX 637 (ALT 250 FOR IP): Performed by: NURSE PRACTITIONER

## 2024-02-28 PROCEDURE — 87070 CULTURE OTHR SPECIMN AEROBIC: CPT

## 2024-02-28 PROCEDURE — 99283 EMERGENCY DEPT VISIT LOW MDM: CPT

## 2024-02-28 PROCEDURE — 87880 STREP A ASSAY W/OPTIC: CPT

## 2024-02-28 PROCEDURE — 87636 SARSCOV2 & INF A&B AMP PRB: CPT

## 2024-02-28 RX ORDER — OXYMETAZOLINE HYDROCHLORIDE 0.05 G/100ML
2 SPRAY NASAL 2 TIMES DAILY
Qty: 6 ML | Refills: 0 | Status: SHIPPED | OUTPATIENT
Start: 2024-02-28 | End: 2024-03-02

## 2024-02-28 RX ORDER — BENZONATATE 100 MG/1
100 CAPSULE ORAL 3 TIMES DAILY PRN
Qty: 21 CAPSULE | Refills: 0 | Status: SHIPPED | OUTPATIENT
Start: 2024-02-28 | End: 2024-03-06

## 2024-02-28 RX ORDER — CETIRIZINE HYDROCHLORIDE 10 MG/1
10 TABLET ORAL DAILY
Qty: 30 TABLET | Refills: 0 | Status: SHIPPED | OUTPATIENT
Start: 2024-02-28

## 2024-02-28 RX ORDER — BENZONATATE 100 MG/1
100 CAPSULE ORAL ONCE
Status: COMPLETED | OUTPATIENT
Start: 2024-02-28 | End: 2024-02-28

## 2024-02-28 RX ORDER — CETIRIZINE HYDROCHLORIDE 10 MG/1
10 TABLET ORAL ONCE
Status: COMPLETED | OUTPATIENT
Start: 2024-02-28 | End: 2024-02-28

## 2024-02-28 RX ADMIN — BENZONATATE 100 MG: 100 CAPSULE ORAL at 09:28

## 2024-02-28 RX ADMIN — Medication 5 ML: at 09:28

## 2024-02-28 RX ADMIN — CETIRIZINE HYDROCHLORIDE 10 MG: 10 TABLET ORAL at 09:28

## 2024-02-28 ASSESSMENT — PAIN - FUNCTIONAL ASSESSMENT: PAIN_FUNCTIONAL_ASSESSMENT: 0-10

## 2024-02-28 ASSESSMENT — PAIN SCALES - GENERAL: PAINLEVEL_OUTOF10: 10

## 2024-02-28 NOTE — ED PROVIDER NOTES
significant wheezes or rails in the lungs.  Heart rate regular.  Patient treated with medications below.  COVID-19 and influenza and strep testing completed.  Results were negative.  Discussed my findings and plan of care with patient she is amenable with discharge.  She is advised to return to the ER with worsening symptoms.  She verbalized understanding of plan of care.  Medications   magic (miracle) mouthwash (5 mLs Swish & Swallow Given 2/28/24 0928)   cetirizine (ZYRTEC) tablet 10 mg (10 mg Oral Given 2/28/24 0928)   benzonatate (TESSALON) capsule 100 mg (100 mg Oral Given 2/28/24 0928)       Patient was seen independently by myself. The patient's final impression and disposition and plan was determined by myself.     CRITICAL CARE:   None    CONSULTS:  None    PROCEDURES:  None    FINAL IMPRESSION     1. Viral URI with cough          DISPOSITION/PLAN   Patient discharged    PATIENT REFERREDTO:  Suni Torres APRN - CNP  [address] [facility] [address] [phone]    Go in 1 week  If symptoms worsen      DISCHARGE MEDICATIONS:  Discharge Medication List as of 2/28/2024 10:55 AM        START taking these medications    Details   oxymetazoline (12 HOUR NASAL SPRAY) 0.05 % nasal spray 2 sprays by Nasal route 2 times daily for 3 days, Disp-6 mL, R-0Normal      benzonatate (TESSALON PERLES) 100 MG capsule Take 1 capsule by mouth 3 times daily as needed for Cough, Disp-21 capsule, R-0Normal             (Please note that portions of this note were completed with a voice recognition program.  Efforts were made to edit the dictations but occasionally words are mis-transcribed.)      Provider:  I personally performed the services described in the documentation,reviewed and edited the documentation which was dictated to the scribe in my presence, and it accurately records my words and actions.    Sulaiman Bell CNP 02/28/24 3:03 PM    MARY Petersen - Sulaiman Carvajal APRN - CNP  02/28/24 9395

## 2024-03-02 LAB
BACTERIA THROAT AEROBE CULT: ABNORMAL
BACTERIA THROAT AEROBE CULT: ABNORMAL
ORGANISM: ABNORMAL

## 2024-03-04 ENCOUNTER — TELEPHONE (OUTPATIENT)
Dept: PHARMACY | Age: 29
End: 2024-03-04

## 2024-03-04 RX ORDER — DOXYCYCLINE HYCLATE 100 MG
100 TABLET ORAL 2 TIMES DAILY
Qty: 10 TABLET | Refills: 0 | Status: SHIPPED | OUTPATIENT
Start: 2024-03-04 | End: 2024-03-09

## 2024-03-04 NOTE — TELEPHONE ENCOUNTER
Pharmacy Note  ED Culture Follow-up    Carissa Tovar is a 28 y.o. female.     Allergies: Penicillins     Labs:  Lab Results   Component Value Date    BUN 11 03/14/2023    CREATININE 0.5 03/14/2023    WBC 14.2 (H) 03/14/2023     CrCl cannot be calculated (Patient's most recent lab result is older than the maximum 180 days allowed.).    Current antimicrobials:   none    ASSESSMENT:  Micro results:   Throat culture: positive for H. influenzae     PLAN:  Need for intervention:  possibly if patient still symptomatic  Discussed with: Ricardo Sky, DO  Chosen treatment:    If symptomatic, start doxycycline 100 mg BID x 5 days    Patient response:   Called and spoke with patient. Patient made aware of the positive results and need for antibiotics.  Patient verbalized understanding.    Called/sent in prescription to:  [facility]    Please call with any questions. Ext. [phone]    Debby Vizcarra RPH, PharmD 5:08 PM 3/4/2024

## 2024-03-27 NOTE — PLAN OF CARE
Patient has attended at least 2 groups today and has also been out of her room to spend time with 2 visitors so she has been able to demonstrate effective coping strategies at this time. .

## 2024-04-27 ENCOUNTER — HOSPITAL ENCOUNTER (EMERGENCY)
Age: 29
Discharge: HOME | End: 2024-04-27
Payer: MEDICAID

## 2024-04-27 VITALS
WEIGHT: 197 LBS | TEMPERATURE: 98.8 F | HEIGHT: 64 IN | SYSTOLIC BLOOD PRESSURE: 142 MMHG | OXYGEN SATURATION: 98 % | DIASTOLIC BLOOD PRESSURE: 80 MMHG | HEART RATE: 87 BPM | RESPIRATION RATE: 18 BRPM | BODY MASS INDEX: 33.63 KG/M2

## 2024-04-27 DIAGNOSIS — J02.0 STREP PHARYNGITIS: Primary | ICD-10-CM

## 2024-04-27 LAB
FLUAV RNA RESP QL NAA+PROBE: NOT DETECTED
FLUBV RNA RESP QL NAA+PROBE: NOT DETECTED
S PYO AG THROAT QL: POSITIVE
S PYO THROAT QL CULT: NORMAL
SARS-COV-2 RNA RESP QL NAA+PROBE: NOT DETECTED

## 2024-04-27 PROCEDURE — 87880 STREP A ASSAY W/OPTIC: CPT

## 2024-04-27 PROCEDURE — 87636 SARSCOV2 & INF A&B AMP PRB: CPT

## 2024-04-27 PROCEDURE — 99283 EMERGENCY DEPT VISIT LOW MDM: CPT

## 2024-04-27 PROCEDURE — 6370000000 HC RX 637 (ALT 250 FOR IP): Performed by: NURSE PRACTITIONER

## 2024-04-27 RX ORDER — AZITHROMYCIN 250 MG/1
TABLET, FILM COATED ORAL
Qty: 1 PACKET | Refills: 0 | Status: SHIPPED | OUTPATIENT
Start: 2024-04-27 | End: 2024-05-01

## 2024-04-27 RX ADMIN — Medication 5 ML: at 09:15

## 2024-04-27 NOTE — ED NOTES
Patient to ED complaining of a sore throat. Patient reports he child was recently diagnosed with strep throat

## 2024-04-27 NOTE — ED PROVIDER NOTES
[facility] Emergency Department    CHIEF COMPLAINT       Chief Complaint   Patient presents with    Pharyngitis       Nurses Notes reviewed and I agree except as noted in the HPI.    HISTORY OF PRESENT ILLNESS   Carissa Tovar is a 28 y.o. female who presents to the ED for evaluation of pharyngitis.  Patient reports sore throat began yesterday, notes that her daughter has strep throat.  She denies fevers chills runny nose cough.  Denies nausea or vomiting.  Denies any significant medical history.  She does have history of H. influenzae and throat approximately 2 months ago.  Past medical history of tonsillectomy      HPI was provided by the patient.    PAST MEDICAL HISTORY     Past Medical History:   Diagnosis Date    History of tubal ligation        SURGICALHISTORY      has a past surgical history that includes Tubal ligation.    CURRENT MEDICATIONS       Discharge Medication List as of 4/27/2024  9:39 AM        CONTINUE these medications which have NOT CHANGED    Details   cetirizine (ZYRTEC) 10 MG tablet Take 1 tablet by mouth daily, Disp-30 tablet, R-0Normal      naproxen (NAPROSYN) 500 MG tablet Take 1 tablet by mouth 2 times daily (with meals) for 30 doses, Disp-30 tablet, R-0Normal      ondansetron (ZOFRAN-ODT) 4 MG disintegrating tablet Take 1 tablet by mouth 3 times daily as needed for Nausea or Vomiting, Disp-21 tablet, R-0Normal      traZODone (DESYREL) 50 MG tablet Take 1 tablet by mouth nightly as needed for Sleep, Disp-10 tablet, R-0Normal      PARoxetine (PAXIL) 30 MG tablet Take 30 mg by mouth every morningHistorical Med      hydrOXYzine HCl (ATARAX) 10 MG tablet Take 10 mg by mouth 3 times daily as needed for ItchingHistorical Med             ALLERGIES     is allergic to penicillins.    FAMILY HISTORY     has no family status information on file.    family history is not on file.    SOCIAL HISTORY       Social History     Socioeconomic History    Marital status:      Spouse name: Not on  file    Number of children: Not on file    Years of education: Not on file    Highest education level: Not on file   Occupational History    Not on file   Tobacco Use    Smoking status: Every Day     Current packs/day: 1.00     Average packs/day: 1 pack/day for 15.0 years (15.0 ttl pk-yrs)     Types: Cigarettes    Smokeless tobacco: Never   Substance and Sexual Activity    Alcohol use: Never    Drug use: Never    Sexual activity: Not on file     Comment: pansexual   Other Topics Concern    Not on file   Social History Narrative    Not on file     Social Determinants of Health     Financial Resource Strain: Not on file   Food Insecurity: Not on file   Transportation Needs: Not on file   Physical Activity: Not on file   Stress: Not on file   Social Connections: Not on file   Intimate Partner Violence: Not on file   Housing Stability: Not on file       PHYSICAL EXAM     INITIAL VITALS:  height is 1.626 m (5' 4\") and weight is 89.4 kg (197 lb). Her temperature is 98.8 °F (37.1 °C). Her blood pressure is 142/80 (abnormal) and her pulse is 87. Her respiration is 18 and oxygen saturation is 98%.    Physical Exam  Vitals and nursing note reviewed.   Constitutional:       Appearance: Normal appearance. She is well-developed.   HENT:      Head: Normocephalic.      Mouth/Throat:      Pharynx: Uvula midline.      Tonsils: No tonsillar exudate or tonsillar abscesses. 0 on the right. 0 on the left.   Eyes:      Conjunctiva/sclera: Conjunctivae normal.   Cardiovascular:      Rate and Rhythm: Normal rate and regular rhythm.      Heart sounds: Normal heart sounds, S1 normal and S2 normal.   Pulmonary:      Effort: Pulmonary effort is normal. No respiratory distress.      Breath sounds: Normal breath sounds.   Chest:      Chest wall: No tenderness.   Abdominal:      General: Bowel sounds are normal. There is no distension.      Palpations: Abdomen is soft.      Tenderness: There is no abdominal tenderness.   Musculoskeletal:

## 2024-04-27 NOTE — DISCHARGE INSTR - COC
ADLs:921349401}  Toileting  {CHP DME ADLs:259808686}  Feeding  {CHP DME ADLs:253137460}  Med Admin  {P DME ADLs:674206916}  Med Delivery   { MAHAD MED Delivery:407374905}    Wound Care Documentation and Therapy:        Elimination:  Continence:   Bowel: {YES / NO:}  Bladder: {YES / NO:}  Urinary Catheter: {Urinary Catheter:210132274}   Colostomy/Ileostomy/Ileal Conduit: {YES / NO:}       Date of Last BM: ***  No intake or output data in the 24 hours ending 24 0942  No intake/output data recorded.    Safety Concerns:     { MAHAD Safety Concerns:913805109}    Impairments/Disabilities:      { MAHAD Impairments/Disabilities:892101757}    Nutrition Therapy:  Current Nutrition Therapy:   { MAHAD Diet List:534531359}    Routes of Feeding: {[facility] DME Other Feedings:274295102}  Liquids: {Slp liquid thickness:71369}  Daily Fluid Restriction: {P DME Yes amt example:875397101}  Last Modified Barium Swallow with Video (Video Swallowing Test): {Done Not Done Date:}    Treatments at the Time of Hospital Discharge:   Respiratory Treatments: ***  Oxygen Therapy:  {Therapy; copd oxygen:01235}  Ventilator:    {[facility] Vent List:496931831}    Rehab Therapies: {THERAPEUTIC INTERVENTION:2186585899}  Weight Bearing Status/Restrictions: {[facility] Weight Bearin}  Other Medical Equipment (for information only, NOT a DME order):  {EQUIPMENT:026123140}  Other Treatments: ***    Patient's personal belongings (please select all that are sent with patient):  {[facility] DME Belongings:740839945}    RN SIGNATURE:  {Esignature:735697743}    CASE MANAGEMENT/SOCIAL WORK SECTION    Inpatient Status Date: ***    Readmission Risk Assessment Score:  Readmission Risk              Risk of Unplanned Readmission:  0           Discharging to Facility/ Agency   Name:   Address:  Phone:  Fax:    Dialysis Facility (if applicable)   Name:  Address:  Dialysis Schedule:  Phone:  Fax:    / signature:  {Esignature:675244303}    PHYSICIAN SECTION    Prognosis: {Prognosis:7822031395}    Condition at Discharge: { Patient Condition:221466313}    Rehab Potential (if transferring to Rehab): {Prognosis:9474032642}    Recommended Labs or Other Treatments After Discharge: ***    Physician Certification: I certify the above information and transfer of Carissa Tovar  is necessary for the continuing treatment of the diagnosis listed and that she requires {Admit to Appropriate Level of Care:21556} for {GREATER/LESS:115272761} 30 days.     Update Admission H&P: {CHP DME Changes in HandP:084740890}    PHYSICIAN SIGNATURE:  {Esignature:448667039}

## 2024-08-28 ENCOUNTER — APPOINTMENT (OUTPATIENT)
Dept: GENERAL RADIOLOGY | Age: 29
End: 2024-08-28
Payer: MEDICAID

## 2024-08-28 ENCOUNTER — HOSPITAL ENCOUNTER (EMERGENCY)
Age: 29
Discharge: HOME | End: 2024-08-28
Payer: MEDICAID

## 2024-08-28 VITALS
HEART RATE: 65 BPM | RESPIRATION RATE: 16 BRPM | DIASTOLIC BLOOD PRESSURE: 57 MMHG | TEMPERATURE: 97.8 F | OXYGEN SATURATION: 100 % | SYSTOLIC BLOOD PRESSURE: 121 MMHG

## 2024-08-28 DIAGNOSIS — G56.01 CARPAL TUNNEL SYNDROME OF RIGHT WRIST: ICD-10-CM

## 2024-08-28 DIAGNOSIS — R59.0 SUBMENTAL LYMPHADENOPATHY: ICD-10-CM

## 2024-08-28 DIAGNOSIS — L01.00 IMPETIGO: ICD-10-CM

## 2024-08-28 DIAGNOSIS — M25.531 ACUTE PAIN OF RIGHT WRIST: Primary | ICD-10-CM

## 2024-08-28 PROCEDURE — 99283 EMERGENCY DEPT VISIT LOW MDM: CPT

## 2024-08-28 PROCEDURE — 73110 X-RAY EXAM OF WRIST: CPT

## 2024-08-28 RX ORDER — MUPIROCIN 20 MG/G
OINTMENT TOPICAL
Qty: 22 G | Refills: 0 | Status: SHIPPED | OUTPATIENT
Start: 2024-08-28 | End: 2024-09-04

## 2024-08-28 NOTE — DISCHARGE INSTRUCTIONS
Wear the wrist splint as directed at night.  You can take it on and off during the day.  Use at work as tolerated.  Continue your naproxen.  You can add on over-the-counter acetaminophen as directed on the bottle for additional pain control.    Use the antibiotic ointment 3 times daily as directed.  Follow-up with your regular physician for recheck.  Call today for follow-up appointment.  You can go to the orthopedic institute [facility] at 8 AM on Friday for reevaluation of the right wrist pain.  At this point, you likely will need an EMG.    Discharge warning    Please remember that examination and testing performed in the emergency department is not a comprehensive evaluation of all medical conditions and does not replace the need to follow up with your primary care provider.  In the emergency department, we are only able to evaluate your symptoms in the current condition, but symptoms may change or worsen.  Although you are felt safe to be discharged today, if your symptoms persist or change, you need to be re-evaluated by your regular/primary care doctor as soon as possible.  If you are unable to make appointment with your regular doctor, please come back to the ER to be re-evaluated.

## 2024-08-28 NOTE — ED PROVIDER NOTES
[facility] EMERGENCY DEPT      EMERGENCY MEDICINE     Pt Name: Carissa Tovar  MRN: [id number]  Birthdate 1995  Date of evaluation: 8/28/2024  Provider: VALARIE Murphy    CHIEF COMPLAINT       Chief Complaint   Patient presents with    Jaw Pain    Wrist Pain     HISTORY OF PRESENT ILLNESS   Carissa Tovar is a pleasant 29 y.o. female who presents to the emergency department for wrist and jaw pain.  Reports she is unaware of when the wrist pain started.  States it is a dull aching pain with numbness and paresthesias to the ring middle and pointer finger.  Says she is right-hand dominant and uses a lot at work.  Patient works at during the day and at night so she said the symptoms are constant.  States they do get better with rest.  Patient denies any trauma to the area.  Also presents with a nodule in the submandibular region posterior to her chin just right of midline.  States she does not know how long it has been there but noticed it was there 2 days ago when she started having jaw pain.  Patient denies any recent URI or personal history of cancer.  States she is still able to close her jaw and consume food.  Patient denies chest pain, shortness of breath.    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

## 2024-11-13 NOTE — ED NOTES
Spoke with [facility], expecting in 40 mins.       Alphonso Lorenz RN  05/29/23 8464 1-2 drinks